# Patient Record
Sex: MALE | Race: WHITE | NOT HISPANIC OR LATINO | Employment: FULL TIME | URBAN - NONMETROPOLITAN AREA
[De-identification: names, ages, dates, MRNs, and addresses within clinical notes are randomized per-mention and may not be internally consistent; named-entity substitution may affect disease eponyms.]

---

## 2021-03-10 NOTE — PROGRESS NOTES
Otolaryngology Consultation    Patient: Shane Rai  : 1970    Patient presents with:  Consult: Allergies; Referred by Elizabeth Ibarra      HPI:  Shane Rai is a 50 year old male seen today for concerns for allergies      He has chronic post nasal drainage      After he eats he note bilateral nasal congestion  Heat will also cause congestion  No obvious worsening with exercise      No history of perennial allergic rhinitis in  he underwent skin testing with Dr. Tomas that showed high sensitivity to tree grass cocklebur mugwort mild dust  He had covid in mid December and still notes hyoposmia and occasional shortness of breath   He notes occasion choking with oral intake    He also notes bilateral tinnitus, recent audio laurentian stated as normal     Current treatment includes Claritin-D Flonase and albuterol he also has an EpiPen    Prior sinus surgery with me around , notes not in epic  He denies chronic sinusitis.  There is no chronic facial pressure purulent rhinorrhea    Apples, cherries, almonds all based on prior testing.  No hx of anaphylaxis with food intake  With green apples he had oral swelling of the lips, no anaphylaxis  Juanita cherries caused lip swelling, no other symptoms  No problems with almond intake      no recent CT sinus    CT sinuses  could not be pulled up in epic but showed hx of FESS with patent omu, mild to moderate pansinusitis      Quit smoking     He also notices a thickening in the back of his tongue he does have sleep apnea and is not CPAP compliant he felt that the cold air blowing through his nasal CPAP caused him a headache  No weight loss  No dysphonia  No chronic otalgia    He has noticed heat intolerance and a sensation as though he may pass out occasionally at work.  He works as a     He does have occasional GERD and describes a recent transnasal esophagoscopy in [place].  Overall this is under fair control  Current Outpatient Rx   Medication Sig  Dispense Refill     albuterol (PROAIR HFA/PROVENTIL HFA/VENTOLIN HFA) 108 (90 Base) MCG/ACT inhaler Inhale 2 puffs into the lungs every 6 hours       clobetasol (TEMOVATE) 0.05 % ointment Apply  topically 2 times daily.       desonide (DESOWEN) 0.05 % external cream Apply topically 2 times daily       dexlansoprazole (DEXILANT) 30 MG CPDR CR capsule Take 30 mg by mouth daily       EPINEPHrine (ANY BX GENERIC EQUIV) 0.3 MG/0.3ML injection 2-pack Inject 0.3 mg into the muscle as needed for anaphylaxis       fluticasone (FLONASE) 50 MCG/ACT nasal spray Spray 1 spray into both nostrils daily       LEVOTHYROXINE SODIUM PO Take 125 mcg by mouth        loratadine-pseudoePHEDrine (CLARITIN-D 24-HOUR)  MG 24 hr tablet Take 1 tablet by mouth daily       olopatadine (PATADAY) 0.2 % ophthalmic solution 1 drop daily       HYDROcodone-acetaminophen (NORCO) 5-325 MG per tablet Take 1-2 tablets by mouth every 4 hours as needed for moderate to severe pain (Patient not taking: Reported on 3/11/2021) 15 tablet 0       Allergies: Seasonal allergies and Zyrtec [cetirizine]     Past Medical History:   Diagnosis Date     Gastro-oesophageal reflux disease      Hypothyroidism        Past Surgical History:   Procedure Laterality Date     deviated septum[       ESOPHAGOSCOPY, GASTROSCOPY, DUODENOSCOPY (EGD), COMBINED  2014    Procedure: COMBINED ESOPHAGOSCOPY, GASTROSCOPY, DUODENOSCOPY (EGD);  UPPER ENDOSCOPY WITH BIOPSY;  Surgeon: Dima Peraza MD;  Location: [facility]     VASECTOMY         ENT family history reviewed    Social History     Tobacco Use     Smoking status: Former Smoker     Types: Cigarettes     Quit date: 2010     Years since quittin.1     Smokeless tobacco: Never Used   Substance Use Topics     Alcohol use: No     Drug use: None       Review of Systems  ROS: 10 point ROS neg other than the symptoms noted above in the HPI and occasional cough shortness of breath with exertion dry skin hot intolerance  "headaches joint pain    Physical Exam  /86   Pulse 93   Temp 97.8  F (36.6  C) (Tympanic)   Resp 16   Ht 1.778 m (5' 10\")   Wt 87.1 kg (192 lb)   SpO2 97%   BMI 27.55 kg/m    General - The patient is well nourished and well developed, and appears to have good nutritional status.  Alert and oriented to person and place, answers questions and cooperates with examination appropriately.   Head and Face - Normocephalic and atraumatic, with no gross asymmetry noted.  The facial nerve is intact, with strong symmetric movements.  Voice and Breathing - The patient was breathing comfortably without the use of accessory muscles. There was no wheezing, stridor, or stertor.  The patients voice was clear and strong, and had appropriate pitch and quality.  No trevor peripheral digital clubbing or cyanosis   Ears -The external auditory canals are patent, the tympanic membranes are intact without effusion, retraction or mass.  Bony landmarks are intact.  Eyes - Extraocular movements intact, and the pupils were reactive to light.  Sclera were not icteric or injected, conjunctiva were pink and moist.  Mouth - Examination of the oral cavity showed pink, healthy oral mucosa. No lesions or ulcerations noted.  The tongue was mobile and midline, and the dentition were in good condition.    Throat - The walls of the oropharynx were smooth, pink, moist, symmetric, and had no lesions or ulcerations.  The tonsillar pillars and soft palate were symmetric.  The uvula was midline on elevation.  Ball Tongue Position III, grade 1 tonsils   Neck - No palpable enlarged fixed cervical lymph nodes.  No neck cysts or unusual tenderness to palpation.   No palpable fixed thyroid nodules or concerning goiter.  The trachea is grossly midline.   Nose - External contour is symmetric, no gross deflection or scars.  Nasal mucosa is pink and moist with no abnormal mucus.  The septum and turbinates were evaluated: mild TH, septal button in place.  " No polyps, masses, or purulence noted on examination.    Attempts at mirror laryngoscopy were not possible due to gag reflex.  Therefore I proceeded with a fiberoptic examination after informed consent.  First I applied topical nasal lidocaine and neosynephrine.  I then passed the scope through the nasal cavity.  Antrostomy patent bilaterally visualized portion of the ethmoid cavity clear no purulence or polyposis no synechiae    The nasopharynx was mucosally covered and symmetric.  The eustachian tube openings were unobstructed.  Going further down I had a clear view of the base of tongue which had normal appearing grade 3 nonerythematous lingual tonsillar tissue.  The base of tongue was free of lesions, and the vallecula was open.  The epiglottis was smooth and mucosally covered.  The supraglottic larynx was then clearly visualized.  The vocal cords moved smoothly and symmetrically and were pearly white.  No endolaryngeal mass erythema or edema.  The pyriform sinuses were open and without trevor mass or pooling of secretions upon valsalva, and the limited view of the postcricoid region did not show any lesions.  The patient tolerated the procedure well.        Impression and Plan- Shane LEEANN Rai is a 50 year old male with:    ICD-10-CM    1. Food allergy  Z91.018 Food Panel ADULT ([employer])   2. Heat intolerance  R68.89 TSH with free T4 reflex   3. Perennial allergic rhinitis  J30.89 loratadine (CLARITIN) 10 MG tablet     levocetirizine (XYZAL) 5 MG tablet   4. oral allergy syndrome  T78.1XXA    5. Other specified hypothyroidism  E03.8    6. Hypertrophic soft palate  K13.79    7. Intolerance of continuous positive airway pressure (CPAP) ventilation  Z78.9    8. JUVENTINO (obstructive sleep apnea)  G47.33    9. History of sinus surgery  Z98.890        Follow-up for food serum specific IgE  Serology panel for potential food allergy has been ordered.  I discussed with the patient that there are many false positive results with  serology for food allergies.  The best treatment of food allergy is elimination of the offending food and dietary journal upon reintroduction in 6 weeks, as long as there is no history of anaphylaxis.    A sleep study will be arranged if this has not already been done at next visit.    Use Flonase 2 sprays, once daily  Use Budesonide Rinses Twice Daily  Use plain Claritin in the AM  Use Xyzal at night  Stop claritin d and pataday eye drops  Complete food blood test and thyroid lab  Increase water  Decrease caffeine  Complete Allergy Skin Testing    Audiogram release [facility]    Intradermal allergy testing , risks including anaphylaxis discussed     Stop pseudoephedrine  Start claritin am, xyzal pm    Stop pataday drops    If chronic congestion persists consider an office turbinate reduction otherwise at this point no indication for additional sinus surgery his sinuses look good    Overall he has a mixed rhinitis he clearly has perennial allergic rhinitis but has some symptoms of nonallergic rhinitis with sensitivity to hot and cold changes.  This was all discussed      Risks of untreated sleep apnea are well documented, including but not limited to, the inability to reach restorative sleep leading to daytime somnolence, fatigue, irritability and poor work performance, risk of motor vehicle accidents, depression, memory issues, waking headaches, impotence, and increased nocturia.  More serious risks include concerns with medication/narcotic use and surgery related to the use of anesthesia, coronary artery disease, heart failure, heart attack, stroke and sudden death.    Achieving a healthy weight, adhering to a healthy diet, and exercise are very important in the treatment of sleep apnea and were discussed and encouraged.    Clinical evidence shows CPAP to be the treatment of choice for obstructive sleep apnea. However, if CPAP is not tolerated after reasonable attempts at treatment, surgical intervention  may be necessary.   A sleep study will be arranged if this has not already been done at next visit.            Una Devlin D.O.  Otolaryngology/Head and Neck Surgery  Allergy

## 2021-03-11 ENCOUNTER — OFFICE VISIT (OUTPATIENT)
Dept: OTOLARYNGOLOGY | Facility: OTHER | Age: 51
End: 2021-03-11
Attending: OTOLARYNGOLOGY
Payer: COMMERCIAL

## 2021-03-11 VITALS
HEIGHT: 70 IN | WEIGHT: 192 LBS | HEART RATE: 93 BPM | RESPIRATION RATE: 16 BRPM | SYSTOLIC BLOOD PRESSURE: 132 MMHG | DIASTOLIC BLOOD PRESSURE: 86 MMHG | OXYGEN SATURATION: 97 % | BODY MASS INDEX: 27.49 KG/M2 | TEMPERATURE: 97.8 F

## 2021-03-11 DIAGNOSIS — J30.89 PERENNIAL ALLERGIC RHINITIS: ICD-10-CM

## 2021-03-11 DIAGNOSIS — Z78.9 INTOLERANCE OF CONTINUOUS POSITIVE AIRWAY PRESSURE (CPAP) VENTILATION: ICD-10-CM

## 2021-03-11 DIAGNOSIS — E03.8 OTHER SPECIFIED HYPOTHYROIDISM: ICD-10-CM

## 2021-03-11 DIAGNOSIS — Z91.018 FOOD ALLERGY: Primary | ICD-10-CM

## 2021-03-11 DIAGNOSIS — T78.1XXA POLLEN-FOOD ALLERGY, INITIAL ENCOUNTER: ICD-10-CM

## 2021-03-11 DIAGNOSIS — K13.79 HYPERTROPHIC SOFT PALATE: ICD-10-CM

## 2021-03-11 DIAGNOSIS — R68.89 HEAT INTOLERANCE: ICD-10-CM

## 2021-03-11 DIAGNOSIS — Z98.890 HISTORY OF SINUS SURGERY: ICD-10-CM

## 2021-03-11 DIAGNOSIS — G47.33 OSA (OBSTRUCTIVE SLEEP APNEA): ICD-10-CM

## 2021-03-11 LAB — TSH SERPL DL<=0.005 MIU/L-ACNC: 2.65 MU/L (ref 0.4–4)

## 2021-03-11 PROCEDURE — 31575 DIAGNOSTIC LARYNGOSCOPY: CPT | Performed by: OTOLARYNGOLOGY

## 2021-03-11 PROCEDURE — 99204 OFFICE O/P NEW MOD 45 MIN: CPT | Mod: 25 | Performed by: OTOLARYNGOLOGY

## 2021-03-11 PROCEDURE — 86003 ALLG SPEC IGE CRUDE XTRC EA: CPT | Mod: 90 | Performed by: OTOLARYNGOLOGY

## 2021-03-11 PROCEDURE — 36415 COLL VENOUS BLD VENIPUNCTURE: CPT | Performed by: OTOLARYNGOLOGY

## 2021-03-11 PROCEDURE — 84443 ASSAY THYROID STIM HORMONE: CPT | Performed by: OTOLARYNGOLOGY

## 2021-03-11 RX ORDER — FLUTICASONE PROPIONATE 50 MCG
2 SPRAY, SUSPENSION (ML) NASAL DAILY
COMMUNITY
End: 2021-03-11

## 2021-03-11 RX ORDER — DESONIDE 0.5 MG/G
CREAM TOPICAL 2 TIMES DAILY
COMMUNITY

## 2021-03-11 RX ORDER — LEVOCETIRIZINE DIHYDROCHLORIDE 5 MG/1
5 TABLET, FILM COATED ORAL EVERY EVENING
Qty: 120 TABLET | Status: SHIPPED | OUTPATIENT
Start: 2021-03-11 | End: 2021-06-18

## 2021-03-11 RX ORDER — OLOPATADINE HYDROCHLORIDE 2 MG/ML
1 SOLUTION/ DROPS OPHTHALMIC DAILY
COMMUNITY
End: 2021-03-11 | Stop reason: ALTCHOICE

## 2021-03-11 RX ORDER — EPINEPHRINE 0.3 MG/.3ML
0.3 INJECTION SUBCUTANEOUS PRN
COMMUNITY
End: 2022-05-06

## 2021-03-11 RX ORDER — ALBUTEROL SULFATE 90 UG/1
2 AEROSOL, METERED RESPIRATORY (INHALATION) EVERY 6 HOURS
COMMUNITY

## 2021-03-11 RX ORDER — DEXLANSOPRAZOLE 30 MG/1
30 CAPSULE, DELAYED RELEASE ORAL DAILY
COMMUNITY

## 2021-03-11 RX ORDER — FLUTICASONE PROPIONATE 50 MCG
2 SPRAY, SUSPENSION (ML) NASAL DAILY
Qty: 16 G | Refills: 12 | Status: SHIPPED | OUTPATIENT
Start: 2021-03-11

## 2021-03-11 RX ORDER — LORATADINE 10 MG/1
10 TABLET ORAL EVERY MORNING
Qty: 120 TABLET | Status: SHIPPED | OUTPATIENT
Start: 2021-03-11 | End: 2021-07-09

## 2021-03-11 ASSESSMENT — MIFFLIN-ST. JEOR: SCORE: 1742.16

## 2021-03-11 ASSESSMENT — PAIN SCALES - GENERAL: PAINLEVEL: NO PAIN (0)

## 2021-03-11 NOTE — NURSING NOTE
"Chief Complaint   Patient presents with     Consult     Allergies; Referred by Elizabeth Ibarra       Initial /86   Pulse 93   Temp 97.8  F (36.6  C) (Tympanic)   Resp 16   Ht 1.778 m (5' 10\")   Wt 87.1 kg (192 lb)   SpO2 97%   BMI 27.55 kg/m   Estimated body mass index is 27.55 kg/m  as calculated from the following:    Height as of this encounter: 1.778 m (5' 10\").    Weight as of this encounter: 87.1 kg (192 lb).  Medication Reconciliation: complete  Kate Galloway LPN  "

## 2021-03-11 NOTE — LETTER
3/11/2021         RE: Shane Rai  [address] [facility] [address]        Dear Colleague,    Thank you for referring your patient, Shane Rai, to the [facility]. Please see a copy of my visit note below.    Otolaryngology Consultation    Patient: Shane Rai  : 1970    Patient presents with:  Consult: Allergies; Referred by Elizabeth Ibarra      HPI:  Shane Rai is a 50 year old male seen today for concerns for allergies      He has chronic post nasal drainage      After he eats he note bilateral nasal congestion  Heat will also cause congestion  No obvious worsening with exercise      No history of perennial allergic rhinitis in  he underwent skin testing with Dr. Tomas that showed high sensitivity to tree grass cocklebur mugwort mild dust  He had covid in mid December and still notes hyoposmia and occasional shortness of breath   He notes occasion choking with oral intake    He also notes bilateral tinnitus, recent audio laurentian stated as normal     Current treatment includes Claritin-D Flonase and albuterol he also has an EpiPen    Prior sinus surgery with me around , notes not in epic  He denies chronic sinusitis.  There is no chronic facial pressure purulent rhinorrhea    Apples, cherries, almonds all based on prior testing.  No hx of anaphylaxis with food intake  With green apples he had oral swelling of the lips, no anaphylaxis  Juanita cherries caused lip swelling, no other symptoms  No problems with almond intake      no recent CT sinus    CT sinuses  could not be pulled up in epic but showed hx of FESS with patent omu, mild to moderate pansinusitis      Quit smoking     He also notices a thickening in the back of his tongue he does have sleep apnea and is not CPAP compliant he felt that the cold air blowing through his nasal CPAP caused him a headache  No weight loss  No dysphonia  No chronic otalgia    He has noticed heat intolerance and a sensation  as though he may pass out occasionally at work.  He works as a     He does have occasional GERD and describes a recent transnasal esophagoscopy in [place].  Overall this is under fair control  Current Outpatient Rx   Medication Sig Dispense Refill     albuterol (PROAIR HFA/PROVENTIL HFA/VENTOLIN HFA) 108 (90 Base) MCG/ACT inhaler Inhale 2 puffs into the lungs every 6 hours       clobetasol (TEMOVATE) 0.05 % ointment Apply  topically 2 times daily.       desonide (DESOWEN) 0.05 % external cream Apply topically 2 times daily       dexlansoprazole (DEXILANT) 30 MG CPDR CR capsule Take 30 mg by mouth daily       EPINEPHrine (ANY BX GENERIC EQUIV) 0.3 MG/0.3ML injection 2-pack Inject 0.3 mg into the muscle as needed for anaphylaxis       fluticasone (FLONASE) 50 MCG/ACT nasal spray Spray 1 spray into both nostrils daily       LEVOTHYROXINE SODIUM PO Take 125 mcg by mouth        loratadine-pseudoePHEDrine (CLARITIN-D 24-HOUR)  MG 24 hr tablet Take 1 tablet by mouth daily       olopatadine (PATADAY) 0.2 % ophthalmic solution 1 drop daily       HYDROcodone-acetaminophen (NORCO) 5-325 MG per tablet Take 1-2 tablets by mouth every 4 hours as needed for moderate to severe pain (Patient not taking: Reported on 3/11/2021) 15 tablet 0       Allergies: Seasonal allergies and Zyrtec [cetirizine]     Past Medical History:   Diagnosis Date     Gastro-oesophageal reflux disease      Hypothyroidism        Past Surgical History:   Procedure Laterality Date     deviated septum[       ESOPHAGOSCOPY, GASTROSCOPY, DUODENOSCOPY (EGD), COMBINED  2014    Procedure: COMBINED ESOPHAGOSCOPY, GASTROSCOPY, DUODENOSCOPY (EGD);  UPPER ENDOSCOPY WITH BIOPSY;  Surgeon: Dima Peraza MD;  Location: [facility]     VASECTOMY         ENT family history reviewed    Social History     Tobacco Use     Smoking status: Former Smoker     Types: Cigarettes     Quit date: 2010     Years since quittin.1     Smokeless tobacco: Never Used  "  Substance Use Topics     Alcohol use: No     Drug use: None       Review of Systems  ROS: 10 point ROS neg other than the symptoms noted above in the HPI and occasional cough shortness of breath with exertion dry skin hot intolerance headaches joint pain    Physical Exam  /86   Pulse 93   Temp 97.8  F (36.6  C) (Tympanic)   Resp 16   Ht 1.778 m (5' 10\")   Wt 87.1 kg (192 lb)   SpO2 97%   BMI 27.55 kg/m    General - The patient is well nourished and well developed, and appears to have good nutritional status.  Alert and oriented to person and place, answers questions and cooperates with examination appropriately.   Head and Face - Normocephalic and atraumatic, with no gross asymmetry noted.  The facial nerve is intact, with strong symmetric movements.  Voice and Breathing - The patient was breathing comfortably without the use of accessory muscles. There was no wheezing, stridor, or stertor.  The patients voice was clear and strong, and had appropriate pitch and quality.  No trevor peripheral digital clubbing or cyanosis   Ears -The external auditory canals are patent, the tympanic membranes are intact without effusion, retraction or mass.  Bony landmarks are intact.  Eyes - Extraocular movements intact, and the pupils were reactive to light.  Sclera were not icteric or injected, conjunctiva were pink and moist.  Mouth - Examination of the oral cavity showed pink, healthy oral mucosa. No lesions or ulcerations noted.  The tongue was mobile and midline, and the dentition were in good condition.    Throat - The walls of the oropharynx were smooth, pink, moist, symmetric, and had no lesions or ulcerations.  The tonsillar pillars and soft palate were symmetric.  The uvula was midline on elevation.  Ball Tongue Position III, grade 1 tonsils   Neck - No palpable enlarged fixed cervical lymph nodes.  No neck cysts or unusual tenderness to palpation.   No palpable fixed thyroid nodules or concerning goiter. "  The trachea is grossly midline.   Nose - External contour is symmetric, no gross deflection or scars.  Nasal mucosa is pink and moist with no abnormal mucus.  The septum and turbinates were evaluated: mild TH, septal button in place.  No polyps, masses, or purulence noted on examination.    Attempts at mirror laryngoscopy were not possible due to gag reflex.  Therefore I proceeded with a fiberoptic examination after informed consent.  First I applied topical nasal lidocaine and neosynephrine.  I then passed the scope through the nasal cavity.  Antrostomy patent bilaterally visualized portion of the ethmoid cavity clear no purulence or polyposis no synechiae    The nasopharynx was mucosally covered and symmetric.  The eustachian tube openings were unobstructed.  Going further down I had a clear view of the base of tongue which had normal appearing grade 3 nonerythematous lingual tonsillar tissue.  The base of tongue was free of lesions, and the vallecula was open.  The epiglottis was smooth and mucosally covered.  The supraglottic larynx was then clearly visualized.  The vocal cords moved smoothly and symmetrically and were pearly white.  No endolaryngeal mass erythema or edema.  The pyriform sinuses were open and without trevor mass or pooling of secretions upon valsalva, and the limited view of the postcricoid region did not show any lesions.  The patient tolerated the procedure well.        Impression and Plan- Shane LEEANN Rai is a 50 year old male with:    ICD-10-CM    1. Food allergy  Z91.018 Food Panel ADULT ([employer])   2. Heat intolerance  R68.89 TSH with free T4 reflex   3. Perennial allergic rhinitis  J30.89 loratadine (CLARITIN) 10 MG tablet     levocetirizine (XYZAL) 5 MG tablet   4. oral allergy syndrome  T78.1XXA    5. Other specified hypothyroidism  E03.8    6. Hypertrophic soft palate  K13.79    7. Intolerance of continuous positive airway pressure (CPAP) ventilation  Z78.9    8. JUVENTINO (obstructive sleep  apnea)  G47.33    9. History of sinus surgery  Z98.890        Follow-up for food serum specific IgE  Serology panel for potential food allergy has been ordered.  I discussed with the patient that there are many false positive results with serology for food allergies.  The best treatment of food allergy is elimination of the offending food and dietary journal upon reintroduction in 6 weeks, as long as there is no history of anaphylaxis.    A sleep study will be arranged if this has not already been done at next visit.    Use Flonase 2 sprays, once daily  Use Budesonide Rinses Twice Daily  Use plain Claritin in the AM  Use Xyzal at night  Stop claritin d and pataday eye drops  Complete food blood test and thyroid lab  Increase water  Decrease caffeine  Complete Allergy Skin Testing    Audiogram release [facility]    Intradermal allergy testing , risks including anaphylaxis discussed     Stop pseudoephedrine  Start claritin am, xyzal pm    Stop pataday drops    If chronic congestion persists consider an office turbinate reduction otherwise at this point no indication for additional sinus surgery his sinuses look good    Overall he has a mixed rhinitis he clearly has perennial allergic rhinitis but has some symptoms of nonallergic rhinitis with sensitivity to hot and cold changes.  This was all discussed      Risks of untreated sleep apnea are well documented, including but not limited to, the inability to reach restorative sleep leading to daytime somnolence, fatigue, irritability and poor work performance, risk of motor vehicle accidents, depression, memory issues, waking headaches, impotence, and increased nocturia.  More serious risks include concerns with medication/narcotic use and surgery related to the use of anesthesia, coronary artery disease, heart failure, heart attack, stroke and sudden death.    Achieving a healthy weight, adhering to a healthy diet, and exercise are very important in the  treatment of sleep apnea and were discussed and encouraged.    Clinical evidence shows CPAP to be the treatment of choice for obstructive sleep apnea. However, if CPAP is not tolerated after reasonable attempts at treatment, surgical intervention may be necessary.   A sleep study will be arranged if this has not already been done at next visit.            Una Devlin D.O.  Otolaryngology/Head and Neck Surgery  Allergy        Again, thank you for allowing me to participate in the care of your patient.        Sincerely,        Una Devlin MD

## 2021-03-12 ENCOUNTER — TELEPHONE (OUTPATIENT)
Dept: ALLERGY | Facility: OTHER | Age: 51
End: 2021-03-12

## 2021-03-12 NOTE — TELEPHONE ENCOUNTER
Went over instructions with patient for allergy skin testing.  Reviewed patients current medications and patient will avoid all contraindicated medications prior to MQT testing.  Patient verbalizes understanding.  Copy of allergy testing packet will be mailed to patient.  Call transferred to PAC to schedule test and follow-up.  Patient is advised to call with any questions before testing.    Rissa Cash RN

## 2021-03-18 NOTE — PROGRESS NOTES
Chief Complaint   Patient presents with     Other     MQT testing         Patient returns for MQT  Last seen by Dr. Devlin on 3/11/21  He has not started on rinses, antihistamines.   Shane has rinsed before in the past and felt that some would remain present.   He has been using Flonase and had some nasal burning. He has since felt the sensation with some relief. He has tried Nasacort in past.   He was started on BID AH, Claritin/ Xyzal.     MQT- 3/19/21  Dilution #6- Birch  Dilution #5-Grass, oak, mold, cat, dog, dust  Dilution #2- weeds, maple, elm, cottonwood, molds.     Food Panel completed- Pending.   No history of perennial allergic rhinitis in 2012 he underwent skin testing with Dr. Tomas that showed high sensitivity to tree grass cocklebur mugwort mild dust  He had covid in mid December and still notes hyoposmia and occasional shortness of breath   He notes occasion choking with oral intake     Apples, cherries, almonds all based on prior testing.  No hx of anaphylaxis with food intake  With green apples he had oral swelling of the lips, no anaphylaxis  Juanita cherries caused lip swelling, no other symptoms  No problems with almond intake  Past Medical History:   Diagnosis Date     Gastro-oesophageal reflux disease      Hypothyroidism         Allergies   Allergen Reactions     Seasonal Allergies      Birch trees, oak trees, grass pollen, mugwart     Zyrtec [Cetirizine] Other (See Comments)     HOT BURNING feeling over entire body.     Current Outpatient Medications   Medication     albuterol (PROAIR HFA/PROVENTIL HFA/VENTOLIN HFA) 108 (90 Base) MCG/ACT inhaler     clobetasol (TEMOVATE) 0.05 % ointment     desonide (DESOWEN) 0.05 % external cream     dexlansoprazole (DEXILANT) 30 MG CPDR CR capsule     EPINEPHrine (ANY BX GENERIC EQUIV) 0.3 MG/0.3ML injection 2-pack     fluticasone (FLONASE) 50 MCG/ACT nasal spray     HYDROcodone-acetaminophen (NORCO) 5-325 MG per tablet     levocetirizine (XYZAL) 5 MG  "tablet     LEVOTHYROXINE SODIUM PO     loratadine (CLARITIN) 10 MG tablet     No current facility-administered medications for this visit.       ROS: 10 point ROS neg other than the symptoms noted above in the HPI.  BP (!) 142/93 (BP Location: Right arm, Patient Position: Sitting, Cuff Size: Adult Regular)   Pulse 78   Temp 97.9  F (36.6  C) (Oral)   Ht 1.778 m (5' 10\")   Wt 88.5 kg (195 lb)   SpO2 97%   BMI 27.98 kg/m      and oriented to person and place, answers questions and cooperates with examination appropriately.   Head and Face - Normocephalic and atraumatic, with no gross asymmetry noted.  The facial nerve is intact, with strong symmetric movements.  Voice and Breathing - The patient was breathing comfortably without the use of accessory muscles. There was no wheezing, stridor, or stertor.  The patients voice was clear and strong, and had appropriate pitch and quality.  No trevor peripheral digital clubbing or cyanosis   Ears -The external auditory canals are patent, the tympanic membranes are intact without effusion, retraction or mass.  Bony landmarks are intact.  Eyes - Extraocular movements intact, and the pupils were reactive to light.  Sclera were not icteric or injected, conjunctiva were pink and moist.  Mouth - Examination of the oral cavity showed pink, healthy oral mucosa. No lesions or ulcerations noted.  The tongue was mobile and midline, and the dentition were in good condition.    Throat - The walls of the oropharynx were smooth, pink, moist, symmetric, and had no lesions or ulcerations.  The tonsillar pillars and soft palate were symmetric.  The uvula was midline on elevation.  Ball Tongue Position III, grade 1 tonsils   Neck - No palpable enlarged fixed cervical lymph nodes.  No neck cysts or unusual tenderness to palpation.   No palpable fixed thyroid nodules or concerning goiter.  The trachea is grossly midline.   Nose - External contour is symmetric, no gross deflection or " scars.  Nasal mucosa is pink and moist with no abnormal mucus.  The septum and turbinates were evaluated: mild TH, septal button in place.  No polyps, masses, or purulence noted on examination.      ASSESSMENT:    ICD-10-CM    1. Allergic reaction, subsequent encounter  T78.40XD EPINEPHrine (ANY BX GENERIC EQUIV) 0.3 MG/0.3ML injection 2-pack   2. Food allergy  Z91.018    3. Perennial allergic rhinitis  J30.89    4. oral allergy syndrome  T78.1XXA    5. JUVENTINO (obstructive sleep apnea)  G47.33    6. Intolerance of continuous positive airway pressure (CPAP) ventilation  Z78.9        Food panel is pending at this time. Will call with results.   Sleep medicine referral placed.   Epipen sent in. Must bring with you to start allergy injections.   Start Xyzal tonight and Start Claritin 10 mg in the AM.   Start Fabián Med rinse. Will send in new request for Budesonide. (avoid rinsing for 1 hour before bed)  Continue with Flonase.     Follow up in 3 months.     Sleep study ordered. He does need to restart use of CPAP. Risks and complications of untreated JUVENTINO reviewed.       Krupa Fowler PA-C  ENT  [facility], [place]  [phone]

## 2021-03-19 ENCOUNTER — TELEPHONE (OUTPATIENT)
Dept: ALLERGY | Facility: OTHER | Age: 51
End: 2021-03-19

## 2021-03-19 ENCOUNTER — OFFICE VISIT (OUTPATIENT)
Dept: ALLERGY | Facility: OTHER | Age: 51
End: 2021-03-19
Attending: PHYSICIAN ASSISTANT
Payer: COMMERCIAL

## 2021-03-19 ENCOUNTER — OFFICE VISIT (OUTPATIENT)
Dept: OTOLARYNGOLOGY | Facility: OTHER | Age: 51
End: 2021-03-19
Attending: PHYSICIAN ASSISTANT
Payer: COMMERCIAL

## 2021-03-19 VITALS
SYSTOLIC BLOOD PRESSURE: 142 MMHG | OXYGEN SATURATION: 97 % | WEIGHT: 195 LBS | TEMPERATURE: 97.9 F | HEART RATE: 78 BPM | DIASTOLIC BLOOD PRESSURE: 93 MMHG | BODY MASS INDEX: 27.92 KG/M2 | HEIGHT: 70 IN

## 2021-03-19 DIAGNOSIS — J30.89 PERENNIAL ALLERGIC RHINITIS: Primary | ICD-10-CM

## 2021-03-19 DIAGNOSIS — Z78.9 INTOLERANCE OF CONTINUOUS POSITIVE AIRWAY PRESSURE (CPAP) VENTILATION: ICD-10-CM

## 2021-03-19 DIAGNOSIS — Z91.018 FOOD ALLERGY: ICD-10-CM

## 2021-03-19 DIAGNOSIS — T78.1XXA POLLEN-FOOD ALLERGY, INITIAL ENCOUNTER: ICD-10-CM

## 2021-03-19 DIAGNOSIS — T78.40XD ALLERGIC REACTION, SUBSEQUENT ENCOUNTER: Primary | ICD-10-CM

## 2021-03-19 DIAGNOSIS — G47.33 OSA (OBSTRUCTIVE SLEEP APNEA): ICD-10-CM

## 2021-03-19 DIAGNOSIS — J30.89 PERENNIAL ALLERGIC RHINITIS: ICD-10-CM

## 2021-03-19 PROCEDURE — 95004 PERQ TESTS W/ALRGNC XTRCS: CPT

## 2021-03-19 PROCEDURE — 99213 OFFICE O/P EST LOW 20 MIN: CPT | Mod: 25 | Performed by: PHYSICIAN ASSISTANT

## 2021-03-19 PROCEDURE — 95024 IQ TESTS W/ALLERGENIC XTRCS: CPT

## 2021-03-19 RX ORDER — EPINEPHRINE 0.3 MG/.3ML
0.3 INJECTION SUBCUTANEOUS PRN
Qty: 0.6 ML | Refills: 1 | Status: SHIPPED | OUTPATIENT
Start: 2021-03-19 | End: 2023-03-04

## 2021-03-19 ASSESSMENT — PAIN SCALES - GENERAL: PAINLEVEL: NO PAIN (0)

## 2021-03-19 ASSESSMENT — ASTHMA QUESTIONNAIRES
QUESTION_2 LAST FOUR WEEKS HOW OFTEN HAVE YOU HAD SHORTNESS OF BREATH: ONCE OR TWICE A WEEK
ACT_TOTALSCORE: 22
QUESTION_4 LAST FOUR WEEKS HOW OFTEN HAVE YOU USED YOUR RESCUE INHALER OR NEBULIZER MEDICATION (SUCH AS ALBUTEROL): ONCE A WEEK OR LESS
QUESTION_3 LAST FOUR WEEKS HOW OFTEN DID YOUR ASTHMA SYMPTOMS (WHEEZING, COUGHING, SHORTNESS OF BREATH, CHEST TIGHTNESS OR PAIN) WAKE YOU UP AT NIGHT OR EARLIER THAN USUAL IN THE MORNING: NOT AT ALL
QUESTION_5 LAST FOUR WEEKS HOW WOULD YOU RATE YOUR ASTHMA CONTROL: WELL CONTROLLED
QUESTION_1 LAST FOUR WEEKS HOW MUCH OF THE TIME DID YOUR ASTHMA KEEP YOU FROM GETTING AS MUCH DONE AT WORK, SCHOOL OR AT HOME: NONE OF THE TIME

## 2021-03-19 ASSESSMENT — MIFFLIN-ST. JEOR: SCORE: 1755.76

## 2021-03-19 NOTE — PROGRESS NOTES
Shane was seen for allergy skin testing. Patient was seen by this nurse in conjunction with ENT provider. All encounter details are documented in ENT Provider's appointment from this same date. Please see referenced encounter for this visits documentation.     Clyde Blackwood RN on 3/19/2021 at 10:20 AM

## 2021-03-19 NOTE — LETTER
3/19/2021         RE: Shane Rai  [address] [facility] [address]        Dear Colleague,    Thank you for referring your patient, Shane Rai, to the [facility]. Please see a copy of my visit note below.    Chief Complaint   Patient presents with     Other     MQT testing         Patient returns for MQT  Last seen by Dr. Devlin on 3/11/21  He has not started on rinses, antihistamines.   Shane has rinsed before in the past and felt that some would remain present.   He has been using Flonase and had some nasal burning. He has since felt the sensation with some relief. He has tried Nasacort in past.   He was started on BID AH, Claritin/ Xyzal.     MQT- 3/19/21  Dilution #6- Birch  Dilution #5-Grass, oak, mold, cat, dog, dust  Dilution #2- weeds, maple, elm, cottonwood, molds.     Food Panel completed- Pending.   No history of perennial allergic rhinitis in 2012 he underwent skin testing with Dr. Tomas that showed high sensitivity to tree grass cocklebur mugwort mild dust  He had covid in mid December and still notes hyoposmia and occasional shortness of breath   He notes occasion choking with oral intake     Apples, cherries, almonds all based on prior testing.  No hx of anaphylaxis with food intake  With green apples he had oral swelling of the lips, no anaphylaxis  Juanita cherries caused lip swelling, no other symptoms  No problems with almond intake  Past Medical History:   Diagnosis Date     Gastro-oesophageal reflux disease      Hypothyroidism         Allergies   Allergen Reactions     Seasonal Allergies      Birch trees, oak trees, grass pollen, mugwart     Zyrtec [Cetirizine] Other (See Comments)     HOT BURNING feeling over entire body.     Current Outpatient Medications   Medication     albuterol (PROAIR HFA/PROVENTIL HFA/VENTOLIN HFA) 108 (90 Base) MCG/ACT inhaler     clobetasol (TEMOVATE) 0.05 % ointment     desonide (DESOWEN) 0.05 % external cream     dexlansoprazole  "(DEXILANT) 30 MG CPDR CR capsule     EPINEPHrine (ANY BX GENERIC EQUIV) 0.3 MG/0.3ML injection 2-pack     fluticasone (FLONASE) 50 MCG/ACT nasal spray     HYDROcodone-acetaminophen (NORCO) 5-325 MG per tablet     levocetirizine (XYZAL) 5 MG tablet     LEVOTHYROXINE SODIUM PO     loratadine (CLARITIN) 10 MG tablet     No current facility-administered medications for this visit.       ROS: 10 point ROS neg other than the symptoms noted above in the HPI.  BP (!) 142/93 (BP Location: Right arm, Patient Position: Sitting, Cuff Size: Adult Regular)   Pulse 78   Temp 97.9  F (36.6  C) (Oral)   Ht 1.778 m (5' 10\")   Wt 88.5 kg (195 lb)   SpO2 97%   BMI 27.98 kg/m      and oriented to person and place, answers questions and cooperates with examination appropriately.   Head and Face - Normocephalic and atraumatic, with no gross asymmetry noted.  The facial nerve is intact, with strong symmetric movements.  Voice and Breathing - The patient was breathing comfortably without the use of accessory muscles. There was no wheezing, stridor, or stertor.  The patients voice was clear and strong, and had appropriate pitch and quality.  No trevor peripheral digital clubbing or cyanosis   Ears -The external auditory canals are patent, the tympanic membranes are intact without effusion, retraction or mass.  Bony landmarks are intact.  Eyes - Extraocular movements intact, and the pupils were reactive to light.  Sclera were not icteric or injected, conjunctiva were pink and moist.  Mouth - Examination of the oral cavity showed pink, healthy oral mucosa. No lesions or ulcerations noted.  The tongue was mobile and midline, and the dentition were in good condition.    Throat - The walls of the oropharynx were smooth, pink, moist, symmetric, and had no lesions or ulcerations.  The tonsillar pillars and soft palate were symmetric.  The uvula was midline on elevation.  Ball Tongue Position III, grade 1 tonsils   Neck - No palpable " enlarged fixed cervical lymph nodes.  No neck cysts or unusual tenderness to palpation.   No palpable fixed thyroid nodules or concerning goiter.  The trachea is grossly midline.   Nose - External contour is symmetric, no gross deflection or scars.  Nasal mucosa is pink and moist with no abnormal mucus.  The septum and turbinates were evaluated: mild TH, septal button in place.  No polyps, masses, or purulence noted on examination.      ASSESSMENT:    ICD-10-CM    1. Allergic reaction, subsequent encounter  T78.40XD EPINEPHrine (ANY BX GENERIC EQUIV) 0.3 MG/0.3ML injection 2-pack   2. Food allergy  Z91.018    3. Perennial allergic rhinitis  J30.89    4. oral allergy syndrome  T78.1XXA    5. JUVENTINO (obstructive sleep apnea)  G47.33    6. Intolerance of continuous positive airway pressure (CPAP) ventilation  Z78.9        Food panel is pending at this time. Will call with results.   Sleep medicine referral placed.   Epipen sent in. Must bring with you to start allergy injections.   Start Xyzal tonight and Start Claritin 10 mg in the AM.   Start Fabián Med rinse. Will send in new request for Budesonide. (avoid rinsing for 1 hour before bed)  Continue with Flonase.     Follow up in 3 months.     Sleep study ordered. He does need to restart use of CPAP. Risks and complications of untreated JUVENTINO reviewed.       Krupa Fowler PA-C  ENT  [facility], [place]  [phone]        Again, thank you for allowing me to participate in the care of your patient.        Sincerely,        Krupa Fowler PA-C

## 2021-03-19 NOTE — NURSING NOTE
"Chief Complaint   Patient presents with     Other     MQT testing       Initial BP (!) 150/92 (BP Location: Left arm, Patient Position: Sitting, Cuff Size: Adult Regular)   Pulse 78   Temp 97.9  F (36.6  C) (Oral)   Ht 1.778 m (5' 10\")   Wt 88.5 kg (195 lb)   SpO2 97%   BMI 27.98 kg/m   Estimated body mass index is 27.98 kg/m  as calculated from the following:    Height as of this encounter: 1.778 m (5' 10\").    Weight as of this encounter: 88.5 kg (195 lb).  Medication Reconciliation: complete     Clyde Blackwood RN    This patient presents today for allergy skin testing.      Bp was elevated during this visit.  He does have a headache and some dizziness, that he attributes to his allergies and not Bp.  He has seen a cardiologist and Bp's have been wnl.  Bp's today were 147/90, 150/92. 142/93 and 54/93.    Symptoms have included chronic PND, bilateral nasal congestion (worse after eating), he said sometimes it feels like his nose will \"close up\" and he has to mouth breath.  Heat seems to cause an increase in his congestion.  He had Covid in December, and his taste and smell aren't completely back to normal. He has some SOB and occasional choking with oral intake.  He has bilateral tinnitus in his ears. He has no h/o of ear surgeries, but said he has some fluid build up bilaterally.  He denies any real skin concerns.  Today he noticed a slight rash on his neck and cheek, but denies any itching. He has allergy induced asthma, and has a rescue inhaler.  ACT score 22 today.  He has had previous septoplasty and sinus surgery with  in 2015.   He has symptoms year round.     This patient lives in a single family home built in the early 50's.  [place]. There is a basement. No concerns for mold, water or moisture issues in the home.  There is some carpet in the home, and there is carpet in the bedroom.  Home has forced heat and does have a air conditioning wall unit.     This patient " has 1 dog for a pet.  The dog is  Inside and sleeps on his bed.    This patient has had allergy testing in the past around 2012.  He had high sensitivity to trees, grass, cocklebur, mugwort, mold and dust.  He has food allergies.  He has lip swelling from green apples, and hans cherries.  No anaphylaxis noted  He has a food RAST pending.    This patient's medications have been reviewed prior to testing and all appropriate medications have been stopped.    Consent is signed by patient and signature is verified.     MQT/ID test is performed per protocol.  The patient tolerated testing well.  [id number] dose given post testing and benadryl gel applied to both sites s/p testing. All findings are recorded on the paper flow sheet. Results are reviewed with this patient.  They are given written information regarding allergy.       The patient will follow-up with Krupa Fowler PA-C for treatment plan.      Clyde Blackwood RN on 3/19/2021 at 10:14 AM

## 2021-03-19 NOTE — PATIENT INSTRUCTIONS
Food panel is pending at this time. Will call with results.   Sleep medicine referral placed.   Epipen sent in. Must bring with you to start allergy injections.   Start Xyzal tonight and Start Claritin 10 mg at night.   Start Fabián Med rinse. Will send in new request for Budesonide. (avoid rinsing for 1 hour before bed)  Continue with Flonase.     Follow up in 3 months.     Thank you for allowing Krupa Fowler PA-C and our ENT team to participate in your care.  If your medications are too expensive, please give the nurse a call.  We can possibly change this medication.  If you have a scheduling or an appointment question please contact our Health Unit Coordinator at [phone], Ext. [phone].    ALL nursing questions or concerns can be directed to your ENT nurse at: [phone] [place]

## 2021-03-20 ASSESSMENT — ASTHMA QUESTIONNAIRES: ACT_TOTALSCORE: 22

## 2021-03-24 ENCOUNTER — ALLIED HEALTH/NURSE VISIT (OUTPATIENT)
Dept: ALLERGY | Facility: OTHER | Age: 51
End: 2021-03-24
Attending: PHYSICIAN ASSISTANT
Payer: COMMERCIAL

## 2021-03-24 ENCOUNTER — TELEPHONE (OUTPATIENT)
Dept: OTOLARYNGOLOGY | Facility: OTHER | Age: 51
End: 2021-03-24

## 2021-03-24 DIAGNOSIS — J30.89 PERENNIAL ALLERGIC RHINITIS: Primary | ICD-10-CM

## 2021-03-24 PROCEDURE — 95165 ANTIGEN THERAPY SERVICES: CPT | Performed by: PHYSICIAN ASSISTANT

## 2021-03-24 RX ORDER — BUDESONIDE 0.5 MG/2ML
INHALANT ORAL
Qty: 60 ML | Refills: 0 | Status: SHIPPED | OUTPATIENT
Start: 2021-03-24 | End: 2021-04-06

## 2021-03-24 NOTE — TELEPHONE ENCOUNTER
This patient was supposed to start budesonide rinses, but these were never sent in. Please send to [facility]

## 2021-03-24 NOTE — PROGRESS NOTES
Allergy serum is mixed today at schedule Silver 1 of 4,  into  2  (5 ml)  multi dose vial/vials.    Allergens included were:    Ragweed  0.2 ml of dilution # 2  Pigweed  0.2 ml of dilution # 2  Mugwort 0.2 ml of dilution # 0  Kochia  0.2 ml of dilution # 0  Russian Thistle 0.2 ml of dilution # 2  Romel Grass 0.2 ml of dilution # 3  Birch mix 0.2 ml of dilution # 4  Maple Mix 0.2 of dilution # 2  Elm Mix 0.2 ml of dilution # 2  Oak Mix 0.2 ml of dilution # 3  Chele Mix 0.2 ml of dilution # 0  Pine Mix 0.2 ml of dilution # 0  Eastern [place] 0.2 ml of dilution # 2  Black [place] 0.2 ml of dilution # 0  Aspen 0.2 ml of dilution # 0  Red [place] 0.2 ml of dilution # 0    Alternaria 0.2 ml of dilution # 2  Aspergillus 0.2 ml of dilution # 2  Hormodendrum 0.2 ml of dilution # 2  Helminthosporium 0.2 ml of dilution # 2  Penicillium 0.2 ml of dilution # 2  Epicoccum 0.2 ml of dilution # 2  Fusarium 0.2 ml of dilution # 0  Mucor 0.2 ml of dilution # 0  Grain Smut 0.2 ml of dilution # 0  Grass Smut 0.2 ml of dilution # 0  Cat 0.2 ml of dilution # 2  Dog 0.2 ml of dilution # 2  Feather Mix 0.2 ml of dilution # 0  Dust Mite Mix 0.2 ml of dilution # 2  Horse 0.2 ml of dilution # 0    Clyde Blackwood RN on 3/24/2021 at 9:06 AM

## 2021-03-26 ENCOUNTER — TELEPHONE (OUTPATIENT)
Dept: OTOLARYNGOLOGY | Facility: OTHER | Age: 51
End: 2021-03-26

## 2021-04-05 DIAGNOSIS — J30.89 PERENNIAL ALLERGIC RHINITIS: ICD-10-CM

## 2021-04-05 NOTE — TELEPHONE ENCOUNTER
Budesonide  Last Written Prescription Date: 3/24/21  Last Fill Quantity: 60 ml # of Refills: 0  Last Office Visit: 3/19/21

## 2021-04-06 DIAGNOSIS — J30.89 PERENNIAL ALLERGIC RHINITIS: ICD-10-CM

## 2021-04-06 RX ORDER — BUDESONIDE 0.5 MG/2ML
INHALANT ORAL
Qty: 60 ML | Refills: 0 | Status: SHIPPED | OUTPATIENT
Start: 2021-04-06 | End: 2021-05-12

## 2021-04-06 RX ORDER — BUDESONIDE 0.5 MG/2ML
INHALANT ORAL
Qty: 60 ML | Refills: 0 | Status: SHIPPED | OUTPATIENT
Start: 2021-04-06 | End: 2021-04-06

## 2021-04-06 NOTE — TELEPHONE ENCOUNTER
Requesting this med be sent in a 90 day quantity.  Thanks    Budesonide       Last Written Prescription Date:  3/24/21  Last Fill Quantity: 60 mL,   # refills: 0  Last Office Visit: 3/19/21  Future Office visit:    Next 5 appointments (look out 90 days)    Apr 09, 2021  2:30 PM  Office Visit with [facility]  [facility] [address]  [place] [address]  [phone]   Jun 18, 2021  3:45 PM  (Arrive by 3:30 PM)  Return Visit with Krupa Fowler PA-C  [facility] ) [address]  [place] [address]  [phone]           Routing refill request to provider for review/approval because:

## 2021-04-09 ENCOUNTER — OFFICE VISIT (OUTPATIENT)
Dept: ALLERGY | Facility: OTHER | Age: 51
End: 2021-04-09
Attending: PHYSICIAN ASSISTANT
Payer: COMMERCIAL

## 2021-04-09 DIAGNOSIS — J30.89 PERENNIAL ALLERGIC RHINITIS: Primary | ICD-10-CM

## 2021-04-09 PROCEDURE — 95117 IMMUNOTHERAPY INJECTIONS: CPT

## 2021-04-09 NOTE — PROGRESS NOTES
Patient presents to allergy clinic for education and training on subcutaneous immunotherapy.  Patient educated on epi pen and an indications for use.  Patient shown how to use epi-pen using a , and patient performed a return demonstration.  Patient verbalizes understanding.  New patient information sheet given and discussed anaphylaxis, local reactions and answered all questions to patients satisfaction.     Safety vial test was done in the right upper arm, for new Silver vial # 1.   Administered  0.01ml (ID) for SVT.  SVT = 7 mm.   Passed.    Safety vial test was done in the right lower arm, for new Silver vial # 2.   Administered  0.01ml (ID) for SVT.  SVT = 7 mm.   Passed.    Allergy injection/s given and charted on paper allergy flow sheet.  Patient experienced no reaction.  Epi pen is present today.  Patient has a follow-up scheduled for 3 months with LSIS Benton.     Rissa Cash RN

## 2021-04-13 ENCOUNTER — TELEPHONE (OUTPATIENT)
Dept: ALLERGY | Facility: OTHER | Age: 51
End: 2021-04-13

## 2021-04-13 NOTE — TELEPHONE ENCOUNTER
When patient started his allergy shots last Friday, he had questions about his allergy medications.  He asked why he is on two antihistamine medications and if he needs to continue with budesonide rinses BID (and for how long).  Discussed with LISS Benton, and the medications are prescribed the way they are for symptom management.  She suggested he remain on the medications for at least 4 weeks since he is new to allergy shots.  If he is doing well, he can drop down to one antihistamine daily and the budesonide rinses down to once daily.      Patient notified of the above information.  Patient notes that taking Xyzal in the evening still left him groggy in the morning, so he stopped this medication.  He is taking Claritin in the morning and doing the budesonide rinses.  Advised patient he should monitor his symptoms while on allergy shots.  If his symptoms are not well managed, he will likely need to resume taking an antihistamine BID.  Patient verbalized understanding.    Rissa Cash RN

## 2021-04-14 ENCOUNTER — DOCUMENTATION ONLY (OUTPATIENT)
Dept: SLEEP MEDICINE | Facility: HOSPITAL | Age: 51
End: 2021-04-14

## 2021-04-14 DIAGNOSIS — R53.82 CHRONIC FATIGUE: ICD-10-CM

## 2021-04-14 DIAGNOSIS — F41.1 GENERALIZED ANXIETY DISORDER: Primary | ICD-10-CM

## 2021-04-14 DIAGNOSIS — G47.33 OSA (OBSTRUCTIVE SLEEP APNEA): ICD-10-CM

## 2021-04-14 DIAGNOSIS — F33.0 MAJOR DEPRESSIVE DISORDER, RECURRENT EPISODE, MILD (H): ICD-10-CM

## 2021-04-14 NOTE — PROGRESS NOTES
STOP SAV       Name: Shane Rai MRN# [id number]   Age: 50 year old YOB: 1970     Stop Bang questionnaire completed with a score of >3 to allow for HST     Have you been told you snore loudly (louder than talking or loud enough to be heard through doors)? YES    Do you often feel tired, fatigued, or sleepy during the daytime? YES    Has anyone observed you stop breathing during your sleep? YES    Do you have or are you being treated for high blood pressure? YES    Is your BMI greater than 35? NO    Is your neck size circumference 16 inches or greater? NO    Are you over 50 years old? NO    Stop Bang Score (# of yes): 5

## 2021-04-14 NOTE — PROGRESS NOTES
SLEEP HISTORY QUESTIONNAIRE    Please describe the main reason for your sleep appointment? Feeling tired all the time, brain fog snoring    How long has this been a problem? 10 years    Have you been diagnosed with a sleep problem in the past? YES    If so, what? JUVENTINO    What treatment was recommended? CPAP    Have you had a sleep study in the past? YES    If yes, where and when? [place] 4/29 2013    Sleep Habits:   Do you read in bed? Yes  Do you eat in bed? No  Do you watch TV in bed? Yes  Do you work in bed? No  Do you use a phone or computer in bed? Yes    Is you sleep disturbed by:   Bed partner: Yes  Children: No  Noise: Yes   Pets: No  Other:       On two or more nights per week, do you drink alcohol to help you fall asleep?NO    On two or more nights per week, do you take melatonin to help you fall asleep? NO    On two or more nights per week, do you take over the counter medicine to fall asleep?  NO    Do you take drinks with caffeine (coffee, tea, soda, energy drinks)? NO    Do you have 3 or more caffeine drinks in a day? NO    Do you have caffeine drinks within 6 hours of bedtime? NO    Do you smoke or use tobacco? NO    Do you exercise? YES    Sleep Routine:   Using a 24 Hour Clock    What time do you usually get into bed on workdays? 9-10 pm    Weekend/non work days? 11 pm    What time do you get out of bed on workdays? 4:30 am      Weekend/non work days?8am    Do you work the evening or night shift or do your shifts rotate? NO    How long does it usually take to fall to sleep? 40 minutes    How many times do you wake during the night? 2    How much time do you feel that you are awake during the entire night? 1 hours    How long does it take for you to fall back to sleep after you wake up? 15 minutes    Why do you think you wake up? Partner and neighbors dog    What do you do when you wake up? Put in ear plugs    How much sleep do you think you get on work nights? 5-6 hours    How much sleep do you think  you get on weekends/non work days? 7-8 hours    How much sleep do you think you need to feel your best? 8 hours    How many days during a week do you take a nap on average? 0    What is the average length of your naps? 0    Do you feel better after taking a nap? YES    If you could chose the best sleep schedule for you, what time would you go to bed? 10:30pm  What time would you get up? 7 am    Do you read in bed? YES    Do you eat in bed? NO    Do you watch TV in bed? YES    Do you do work in bed? NO    Do you use a computer or phone in bed? YES    Sleep Disruptions?   Leg movements:  Do you ever have restless, crawling, aching or other unusual feelings in your legs? YES    Do you ever wake yourself by kicking your legs during the night? NO    Are the sheets and blankets messed up or tossed about when you get up? NO    Night-time behaviors:   Do you have nightmares or night terrors? YES   How often? One every other week    Have you had times when you were sleep walking? NO    Have you been seen doing anything unusual while you sleep at nights? NO  What?   How often?     Have you ever hurt yourself or someone else while you were sleeping? NO  Please describe:     Do you clench or grind your teeth during the night? no    Sleep Apnea (pauses in breathing during sleep):  Do you wake with a headache in the morning? YES  How often? Once a week    Does your bed partner, family or friends ever say that you snore? YES  How many nights per week do you snore? all  Can snoring be heard outside the bedroom? yes    Do you ever wake yourself up from snoring, gasping or choking? YES    Have you ever been told that you stop breathing or have pauses in your breathing? YES    Do you wake in the morning with a dry throat or mouth? YES    Do you have trouble breathing through your nose? YES    Do you have problems with heartburn, reflux or a hiatal hernia? YES    Which positions do you usually sleep in? (stomach, back, sides, all) back  and sides    Do you use oxygen or any other medical equipment when you sleep? NO    Do members of your family (related by blood) snore? YES    Have any members of your family been diagnosed with with sleep apnea? YES    Do other members of your family have restless leg? NO    Do other members of your family have sleep walking? NO    Have you ever had an accident, or near accident due to sleepiness while driving? NO    Does your sleepiness affect your work on the job or at school? YES    Do you ever fall asleep by accident while doing a task? NO    Have you had sudden muscle weakness when laughing, angry or surprised? YES    Have you ever been unable to move your body when falling asleep or waking up? NO    Do you ever have trouble  your dreams from real life events? NO  Please describe:     Physical Health: (including illness and injury): During the past 30 days, on how many days was your physical health not good? 1530 days     Mental Health: (including stress, depression, and problems with emotions): During the last 30 days, how may days was your mental health not good? 30 days.     During the past 30 days, on how many days did poor physical or mental health keep you from doing your usual activities? This might be self-care, work, or play? 30 days.     Social History:   Marital status:     Who lives in your home with you? Wife daughter dog    Mother (alive or dead)? alive If has , from what?   Father (alive or dead)? alive If has , from what?     Siblings: YES  Have any ? NO  If so, from what?     Currently working? YES  If yes, work: [employer]  Former jobs: [employer]     Sleepiness Scale:   Sitting and reading 3   Watching TV 2   Sitting in a public place 1   Riding in a car 2   Lying down to rest in the afternoon 2   Sitting and talking to someone 0   Sitting quietly after a lunch without alcohol 1   In a car, stopping for a few minutes in traffic 0        Surgical History:   Past Surgical History:   Procedure Laterality Date     deviated septum[       ESOPHAGOSCOPY, GASTROSCOPY, DUODENOSCOPY (EGD), COMBINED  2/28/2014    Procedure: COMBINED ESOPHAGOSCOPY, GASTROSCOPY, DUODENOSCOPY (EGD);  UPPER ENDOSCOPY WITH BIOPSY;  Surgeon: Dima Peraza MD;  Location: [facility]     VASECTOMY         Medical Conditions:   Past Medical History:   Diagnosis Date     Gastro-oesophageal reflux disease      Hypothyroidism        Medications:   Current Outpatient Medications   Medication Sig     albuterol (PROAIR HFA/PROVENTIL HFA/VENTOLIN HFA) 108 (90 Base) MCG/ACT inhaler Inhale 2 puffs into the lungs every 6 hours     budesonide (PULMICORT) 0.5 MG/2ML neb solution Squirt entire vial into previously made pat med saline bottle, mix, and irrigate each nostril until entire bottle empty. BID.     clobetasol (TEMOVATE) 0.05 % ointment Apply  topically 2 times daily.     desonide (DESOWEN) 0.05 % external cream Apply topically 2 times daily     dexlansoprazole (DEXILANT) 30 MG CPDR CR capsule Take 30 mg by mouth daily     EPINEPHrine (ANY BX GENERIC EQUIV) 0.3 MG/0.3ML injection 2-pack Inject 0.3 mLs (0.3 mg) into the muscle as needed for anaphylaxis     EPINEPHrine (ANY BX GENERIC EQUIV) 0.3 MG/0.3ML injection 2-pack Inject 0.3 mg into the muscle as needed for anaphylaxis     fluticasone (FLONASE) 50 MCG/ACT nasal spray Spray 2 sprays into both nostrils daily     HYDROcodone-acetaminophen (NORCO) 5-325 MG per tablet Take 1-2 tablets by mouth every 4 hours as needed for moderate to severe pain (Patient not taking: Reported on 3/11/2021)     levocetirizine (XYZAL) 5 MG tablet Take 1 tablet (5 mg) by mouth every evening (Patient not taking: Reported on 3/19/2021)     LEVOTHYROXINE SODIUM PO Take 125 mcg by mouth      loratadine (CLARITIN) 10 MG tablet Take 1 tablet (10 mg) by mouth every morning (Patient not taking: Reported on 3/19/2021)     ORDER FOR ALLERGEN IMMUNOTHERAPY Patient to  start allergy shots.  Follow standard build-up protocols.     No current facility-administered medications for this visit.        Are you currently having any of the following symptoms?   General:   Obvious weight gain or loss YES  Fever, chills or sweats NO  Drug allergies:     Eyes:   Changes in vision YES  Blind spots NO  Double vision NO  Other bluriness    Ear, Nose and Throat:   Ear pain NO  Sore throat YES  Sinus pain NO  Post-nasal drip YES  Runny nose NO  Bloody nose NO    Heart:   Rapid or irregular heart beat NO  Chest pain or pressure NO  Out of breath when lying down NO  Swelling in feet or legs NO  High blood pressure YES  Heart disease NO    Nervous system   Headaches YES  Weakness in arms or legs YES  Numbness in arms of legs NO  Other:     Skin  Rashes YES  New moles or skin changes NO  Other     Lungs  Shortness of breath at rest NO  Shortness of breath with activity YES  Dry cough YES  Coughing up mucous or phlegm YES  Coughing up blood NO  Wheezing when breathing NO    Lymph System  Swollen lymph nodes NO  New lumps or bumps NO  Changes in breasts or discharge NO    Digestive System   Nausea or vomiting YES  Loose or watery stools NO  Hard, dry stools (constipation) NO  Fat or grease in stools NO  Blood in stools NO  Stools are black or bloody NO  Abdominal (belly) pain NO    Urinary Tract   Pain when you urinate (pee) NO  Blood in your urine NO  Urinate (pee) more than normal NO  Irregular periods NO    Muscles and bones   Muscle pain YES  Joint or bone pain YES  Swollen joints NO  Other     Glands  Increased thirst or urination NO  Diabetes NO  Morning glucose:   Afternoon glucose:     Mental Health  Depression NO  Anxiety NO  Other mental health issues:

## 2021-04-14 NOTE — PROGRESS NOTES
Chart review prior to sleep testing.    Patient Summary:  50 year old yo male who is referred for chronic daytime fatigue, concern for sleep disordered breathing.    Patient Active Problem List    Diagnosis Date Noted     Herpes zoster complication 06/08/2013     Priority: Medium     Backache 08/27/2012     Priority: Medium     IMO Update       Hypothyroidism 06/29/2012     Priority: Medium     Allergic rhinitis 01/05/2011     Priority: Medium     IMO Update       Adjustment disorder with depressed mood 09/08/2008     Priority: Medium     More approapriate diagnosis is major depression due to long term hx of depression.       Generalized anxiety disorder 09/08/2008     Priority: Medium     Major depressive disorder, recurrent episode, mild (H) 09/08/2008     Priority: Medium     IMO Update 10/11       Esophageal reflux 03/22/2007     Priority: Medium     Abdominal pain, epigastric 02/27/2007     Priority: Medium     Anxiety state 04/26/2006     Priority: Medium     IMO Update 10/11         Current Outpatient Medications   Medication     albuterol (PROAIR HFA/PROVENTIL HFA/VENTOLIN HFA) 108 (90 Base) MCG/ACT inhaler     budesonide (PULMICORT) 0.5 MG/2ML neb solution     clobetasol (TEMOVATE) 0.05 % ointment     desonide (DESOWEN) 0.05 % external cream     dexlansoprazole (DEXILANT) 30 MG CPDR CR capsule     EPINEPHrine (ANY BX GENERIC EQUIV) 0.3 MG/0.3ML injection 2-pack     EPINEPHrine (ANY BX GENERIC EQUIV) 0.3 MG/0.3ML injection 2-pack     fluticasone (FLONASE) 50 MCG/ACT nasal spray     HYDROcodone-acetaminophen (NORCO) 5-325 MG per tablet     levocetirizine (XYZAL) 5 MG tablet     LEVOTHYROXINE SODIUM PO     loratadine (CLARITIN) 10 MG tablet     ORDER FOR ALLERGEN IMMUNOTHERAPY     No current facility-administered medications for this visit.        STOP-BANG score of 5, with unknown neck circumference.  [place] score of 11.  BMI of Estimated body mass index is 27.98 kg/m  as calculated from the following:     "Height as of 3/19/21: 1.778 m (5' 10\").    Weight as of 3/19/21: 88.5 kg (195 lb).     Per questionnaire: \"Feeling tired all the time, brain fog snoring\"    Yes for prior sleep testing, [facility] in [place] 4/29/2013.    Unknown weight.  .5 minutes.  AHI 12.9.  Mean SpO2 94%, светлана SpO2 81%.  SpO2 <= 88% for 0.6% of recording.    Last office visit with Dr. Gonzalez on 8/22/2013 - follow-up after trial of CPAP, used 6+ hours nightly, no reported improvement in daytime fatigue.  Low clinical concern for narcolepsy.    Caffeine use:  No for 3+ per day.  No for within 6 hours of bed.    Tobacco use: No    Sleep pattern:  Workdays.  9-10pm to 4:30am, total sleep time 5-6 hours.  Weekends.  11pm - 8am, total sleep time 8 hours.  Time to fall asleep: ~40 minutes.  Awakenings: 2 times per night, 15 minutes to return to sleep.  Napping.  0 days per week, - hours per nap.    Yes for RLS screen.  No for sleep walking.  No for dream enactment behavior.  No for bruxism.    Yes for morning headaches.  Yes for snoring.  Yes for observed apnea.  Yes for FHx of JUVENTINO.    Yes to cataplexy screen.    SHx:  , lives with wife and daughter.  Works as [employer] for [employer].    A/P:  1.)  History of mild JUVENTINO with reported minimal improvement in fatigue with CPAP 8/22/2013.  2.)  Borderline elevated [place], possible positive cataplexy screen.   - Would consider repeat testing for JUVENTINO with home sleep testing and if no significant findings, consider if indication for full hypersomnia testing.  Orders placed for HST.      ---  This note was written with the assistance of the Dragon voice-dictation technology software. The final document, although reviewed, may contain errors. For corrections, please contact the office.    Calixto Michaels MD    Sleep Medicine  [facility]  ([phone])  [facility]  ([phone])    "

## 2021-04-16 ENCOUNTER — ALLIED HEALTH/NURSE VISIT (OUTPATIENT)
Dept: ALLERGY | Facility: OTHER | Age: 51
End: 2021-04-16
Attending: PHYSICIAN ASSISTANT
Payer: COMMERCIAL

## 2021-04-16 DIAGNOSIS — J30.89 PERENNIAL ALLERGIC RHINITIS: Primary | ICD-10-CM

## 2021-04-16 PROCEDURE — 95117 IMMUNOTHERAPY INJECTIONS: CPT

## 2021-04-16 NOTE — PROGRESS NOTES
Prior to injection verified pt identity using pt name and date of birth.    Allergy injection/s given and charted on paper allergy flow sheet.  Patient held 30 min and had no reaction noted.    Clyde Blackwood RN on 4/16/2021 at 4:49 PM

## 2021-04-23 ENCOUNTER — ALLIED HEALTH/NURSE VISIT (OUTPATIENT)
Dept: ALLERGY | Facility: OTHER | Age: 51
End: 2021-04-23
Attending: PHYSICIAN ASSISTANT
Payer: COMMERCIAL

## 2021-04-23 DIAGNOSIS — J30.89 PERENNIAL ALLERGIC RHINITIS: Primary | ICD-10-CM

## 2021-04-23 PROCEDURE — 95117 IMMUNOTHERAPY INJECTIONS: CPT

## 2021-04-23 NOTE — PROGRESS NOTES
Prior to injection, verified patient's identity using patient's name and date of birth.    Allergy injection/s given and charted on paper allergy flow sheet.  Patient stayed 30 minutes for observation, no reaction noted.      Rissa Cash RN

## 2021-04-30 ENCOUNTER — ALLIED HEALTH/NURSE VISIT (OUTPATIENT)
Dept: ALLERGY | Facility: OTHER | Age: 51
End: 2021-04-30
Attending: PHYSICIAN ASSISTANT
Payer: COMMERCIAL

## 2021-04-30 DIAGNOSIS — J30.89 PERENNIAL ALLERGIC RHINITIS: Primary | ICD-10-CM

## 2021-04-30 PROCEDURE — 95117 IMMUNOTHERAPY INJECTIONS: CPT

## 2021-04-30 NOTE — PROGRESS NOTES
Prior to injection, verified patient's identity using patient's name and date of birth.    Allergy injection/s given and charted on paper allergy flow sheet.  Patient held for 30 minutes for observation, no reaction noted.    Rissa Cash RN

## 2021-05-05 ENCOUNTER — TELEPHONE (OUTPATIENT)
Dept: SLEEP MEDICINE | Facility: HOSPITAL | Age: 51
End: 2021-05-05

## 2021-05-07 ENCOUNTER — ALLIED HEALTH/NURSE VISIT (OUTPATIENT)
Dept: ALLERGY | Facility: OTHER | Age: 51
End: 2021-05-07
Attending: PHYSICIAN ASSISTANT
Payer: COMMERCIAL

## 2021-05-07 DIAGNOSIS — J30.2 SEASONAL ALLERGIC RHINITIS, UNSPECIFIED TRIGGER: Primary | ICD-10-CM

## 2021-05-07 PROCEDURE — 95117 IMMUNOTHERAPY INJECTIONS: CPT

## 2021-05-07 NOTE — PROGRESS NOTES
Prior to injection verified pt identity using pt name and date of birth.    Allergy injection/s given and charted on paper allergy flow sheet.  Patient was held 30 minutes after his injection and experienced no reactions.    Clyde Blackwood RN on 5/7/2021 at 4:24 PM

## 2021-05-11 DIAGNOSIS — J30.89 PERENNIAL ALLERGIC RHINITIS: ICD-10-CM

## 2021-05-11 NOTE — TELEPHONE ENCOUNTER
Budesonide  Last Written Prescription Date: 4/6/21  Last Fill Quantity: 60 ml # of Refills: 0  Last Office Visit: 3/19/21

## 2021-05-12 RX ORDER — BUDESONIDE 0.5 MG/2ML
INHALANT ORAL
Qty: 60 ML | Refills: 0 | Status: SHIPPED | OUTPATIENT
Start: 2021-05-12 | End: 2021-10-01

## 2021-05-18 ENCOUNTER — OFFICE VISIT (OUTPATIENT)
Dept: SLEEP MEDICINE | Facility: HOSPITAL | Age: 51
End: 2021-05-18
Attending: FAMILY MEDICINE
Payer: COMMERCIAL

## 2021-05-18 DIAGNOSIS — G47.33 OSA (OBSTRUCTIVE SLEEP APNEA): Primary | ICD-10-CM

## 2021-05-18 PROCEDURE — G0399 HOME SLEEP TEST/TYPE 3 PORTA: HCPCS

## 2021-05-18 PROCEDURE — G0399 HOME SLEEP TEST/TYPE 3 PORTA: HCPCS | Mod: 26 | Performed by: FAMILY MEDICINE

## 2021-05-19 ENCOUNTER — DOCUMENTATION ONLY (OUTPATIENT)
Dept: SLEEP MEDICINE | Facility: HOSPITAL | Age: 51
End: 2021-05-19
Attending: FAMILY MEDICINE
Payer: COMMERCIAL

## 2021-05-19 PROCEDURE — G0399 HOME SLEEP TEST/TYPE 3 PORTA: HCPCS

## 2021-05-20 NOTE — PROGRESS NOTES
This HSAT was performed using a Noxturnal T3 device which recorded snore, sound, movement activity, body position, nasal pressure, oronasal thermal airflow, pulse, oximetry and both chest and abdominal respiratory effort. HSAT data was restricted to the time patient states they were in bed.     HSAT was scored using 1B 4% hypopnea rule.     HST AHI (Non-PAT): 20.4     Snoring was reported as moderate and loud.  Time with SpO2 below 89% was 16.5 minutes.   Overall signal quality was good     Pt will follow up with sleep provider to determine appropriate therapy.

## 2021-05-25 ENCOUNTER — DOCUMENTATION ONLY (OUTPATIENT)
Dept: SLEEP MEDICINE | Facility: CLINIC | Age: 51
End: 2021-05-25

## 2021-05-25 ENCOUNTER — VIRTUAL VISIT (OUTPATIENT)
Dept: SLEEP MEDICINE | Facility: HOSPITAL | Age: 51
End: 2021-05-25
Attending: FAMILY MEDICINE
Payer: COMMERCIAL

## 2021-05-25 DIAGNOSIS — G47.33 OSA (OBSTRUCTIVE SLEEP APNEA): Primary | ICD-10-CM

## 2021-05-25 PROCEDURE — 99204 OFFICE O/P NEW MOD 45 MIN: CPT | Mod: 95 | Performed by: FAMILY MEDICINE

## 2021-05-25 NOTE — PROGRESS NOTES
"Shane Rai is a 51 year old male who is being evaluated via a billable video visit.       The patient has been notified of following:      \"This video visit will be conducted via a call between you and your physician/provider. We have found that certain health care needs can be provided without the need for an in-person physical exam.  This service lets us provide the care you need with a video conversation.  If a prescription is necessary we can send it directly to your pharmacy.  If lab work is needed we can place an order for that and you can then stop by our lab to have the test done at a later time.     Video visits are billed at different rates depending on your insurance coverage.  Please reach out to your insurance provider with any questions.     If during the course of the call the physician/provider feels a video visit is not appropriate, you will not be charged for this service.\"     Patient has given verbal consent for Video visit? Yes  How would you like to obtain your AVS? Mail a copy  If you are dropped from the video visit, the video invite should be resent to: Text to cell phone: [phone]  Will anyone else be joining your video visit? No  If patient encounters technical issues they should call [phone]      Video-Visit Details     Type of service:  Video Visit     Video Start Time: 0830  Video End Time: 0900    Originating Location (pt. Location): Home     Distant Location (provider location):  [facility]      Platform used for Video Visit: LifeV[employer]    Virtual visit for review of home sleep testing results, daytime fatigue.     Assessment:  - Moderate obstructive sleep apnea.  - Very strong positional component (supine AHI 78.7 / lateral AHI 3.3 with 22.6% of recording in supine sleep position)  - Sleep associated hypoxemia was present.  - Primary sleep concerns of socially disruptive snoring, daytime fatigue    Plan:  - We reviewed treatment options of " "retrying CPAP, positional therapy.  - We agreed to proceed with CPAP auto-titrate 5-15 cm H2O.    SUBJECTIVE:  Shane Rai is a 51 year old year old male.    STOP-BANG score of 5, with unknown neck circumference.  [place] score of 11.  BMI of Estimated body mass index is 27.98 kg/m  as calculated from the following:    Height as of 3/19/21: 1.778 m (5' 10\").    Weight as of 3/19/21: 88.5 kg (195 lb).      Per questionnaire: \"Feeling tired all the time, brain fog snoring\"     Yes for prior sleep testing, PSG in [place] 4/29/2013.    Unknown weight.  .5 minutes.  AHI 12.9.  Mean SpO2 94%, светлана SpO2 81%.  SpO2 <= 88% for 0.6% of recording.     Last office visit with Dr. Gonzalez on 8/22/2013 - follow-up after trial of CPAP, used 6+ hours nightly, no reported improvement in daytime fatigue.  Low clinical concern for narcolepsy.  Barriers to use of CPAP were using in winter and excessively cold air.     Caffeine use:  No for 3+ per day.  No for within 6 hours of bed.     Tobacco use: No     Sleep pattern:  Workdays.  9-10pm to 4:30am, total sleep time 5-6 hours.  Weekends.  11pm - 8am, total sleep time 8 hours.  Time to fall asleep: ~40 minutes.  Awakenings: 2 times per night, 15 minutes to return to sleep.  Napping.  0 days per week, - hours per nap.     Yes for RLS screen.  No for sleep walking.  No for dream enactment behavior.  No for bruxism.     Yes for morning headaches.  Yes for snoring.  Yes for observed apnea.  Yes for FHx of JUVENTINO.     Yes to cataplexy screen.     SHx:  , lives with wife and daughter.  Works as  for [employer].    HOME SLEEP STUDY INTERPRETATION     Patient: Shane Rai  MRN: [id number]  YOB: 1970  Study Date: 5/18/2021  Referring Provider: Sam Pettit  Ordering Provider: Calixto Michaels MD     Indications for Home Study: Shane Rai is a 51 year old male with a history of JUVENTINO, hypersomnia, BENJA, MDD who presents with symptoms " "hypersomnia.     Prior Sleep Testin2013 - PSG.  Unknown weight.  .5 minutes.  AHI 12.9.  Mean SpO2 94%, светлана SpO2 81%.  SpO2 <= 88% for 0.6% of recording.     Estimated body mass index is 27.98 kg/m  as calculated from the following:    Height as of 3/19/21: 1.778 m (5' 10\").    Weight as of 3/19/21: 88.5 kg (195 lb).  [place] Sleepiness Scale:   STOP-BAN/8     Data: A full night home sleep study was performed recording the standard physiologic parameters including body position, movement, sound, nasal pressure, thermal oral airflow, chest and abdominal movements with respiratory inductance plethysmography, and oxygen saturation by pulse oximetry. Pulse rate was estimated by oximetry recording. This study was considered adequate based on > 4 hours of quality oximetry and respiratory recording. As specified by the AASM Manual for the Scoring of Sleep and Associated events, version 2.3, Rule VIII.D 1B, 4% oxygen desaturation scoring for hypopneas is used as a standard of care on all home sleep apnea testing.     Analysis Time:  394.9 minutes     Respiration:   Sleep Associated Hypoxemia: sustained hypoxemia was not present. Average oxygen saturation was 93.2%.  Time with saturation less than or equal to 88% was 13.3 minutes. The lowest oxygen saturation was 79%.   Snoring: Snoring was present.  Respiratory events: The home study revealed a presence of 71 obstructive apneas and 0 mixed and central apneas. There were 63 hypopneas resulting in a combined apnea/hypopnea index [AHI] of 20.4 events per hour.  AHI was 78.7 per hour supine, - per hour prone, 3.3 per hour on left side, and - per hour on right side.   Pattern: Excluding events noted above, respiratory rate and pattern was Normal.     Position: Percent of time spent: supine - 22.6%, prone - 0%, on left - 77.4%, on right - 0%.     Heart Rate: By pulse oximetry normal rate was noted.      Assessment:   Moderate obstructive sleep " apnea.  Very strong positional component (supine AHI 78.7 / lateral AHI 3.3 with 22.6% of recording in supine sleep position)  Sleep associated hypoxemia was present.     Recommendations:  Consider auto-CPAP at 5-15 cmH2O, oral appliance therapy, positional therapy or polysomnography with full night PAP titration.  Suggest optimizing sleep hygiene and avoiding sleep deprivation.  Weight management.     Diagnosis Code(s): Obstructive Sleep Apnea G47.33, Hypoxemia G47.36       Past medical history:    Patient Active Problem List    Diagnosis Date Noted     Herpes zoster complication 06/08/2013     Priority: Medium     Backache 08/27/2012     Priority: Medium     IMO Update       Hypothyroidism 06/29/2012     Priority: Medium     Allergic rhinitis 01/05/2011     Priority: Medium     IMO Update       Adjustment disorder with depressed mood 09/08/2008     Priority: Medium     More approapriate diagnosis is major depression due to long term hx of depression.       Generalized anxiety disorder 09/08/2008     Priority: Medium     Major depressive disorder, recurrent episode, mild (H) 09/08/2008     Priority: Medium     IMO Update 10/11       Esophageal reflux 03/22/2007     Priority: Medium     Abdominal pain, epigastric 02/27/2007     Priority: Medium     Anxiety state 04/26/2006     Priority: Medium     IMO Update 10/11         10 point ROS of systems including Constitutional, Eyes, Respiratory, Cardiovascular, Gastroenterology, Genitourinary, Integumentary, Muscularskeletal, Psychiatric were all negative except for pertinent positives noted in my HPI.    Current Outpatient Medications   Medication Sig Dispense Refill     albuterol (PROAIR HFA/PROVENTIL HFA/VENTOLIN HFA) 108 (90 Base) MCG/ACT inhaler Inhale 2 puffs into the lungs every 6 hours       budesonide (PULMICORT) 0.5 MG/2ML neb solution Squirt entire vial into previously made pat med saline bottle, mix, and irrigate each nostril until entire bottle empty. BID.  60 mL 0     clobetasol (TEMOVATE) 0.05 % ointment Apply  topically 2 times daily.       desonide (DESOWEN) 0.05 % external cream Apply topically 2 times daily       dexlansoprazole (DEXILANT) 30 MG CPDR CR capsule Take 30 mg by mouth daily       EPINEPHrine (ANY BX GENERIC EQUIV) 0.3 MG/0.3ML injection 2-pack Inject 0.3 mLs (0.3 mg) into the muscle as needed for anaphylaxis 0.6 mL 1     EPINEPHrine (ANY BX GENERIC EQUIV) 0.3 MG/0.3ML injection 2-pack Inject 0.3 mg into the muscle as needed for anaphylaxis       fluticasone (FLONASE) 50 MCG/ACT nasal spray Spray 2 sprays into both nostrils daily 16 g 12     HYDROcodone-acetaminophen (NORCO) 5-325 MG per tablet Take 1-2 tablets by mouth every 4 hours as needed for moderate to severe pain (Patient not taking: Reported on 3/11/2021) 15 tablet 0     levocetirizine (XYZAL) 5 MG tablet Take 1 tablet (5 mg) by mouth every evening (Patient not taking: Reported on 3/19/2021) 120 tablet prn     LEVOTHYROXINE SODIUM PO Take 125 mcg by mouth        loratadine (CLARITIN) 10 MG tablet Take 1 tablet (10 mg) by mouth every morning (Patient not taking: Reported on 3/19/2021) 120 tablet prn     ORDER FOR ALLERGEN IMMUNOTHERAPY Patient to start allergy shots.  Follow standard build-up protocols. 10 mL PRN       OBJECTIVE:  There were no vitals taken for this visit.    Physical Exam     ---  This note was written with the assistance of the Dragon voice-dictation technology software. The final document, although reviewed, may contain errors. For corrections, please contact the office.    Calixto Michaels MD    Sleep Medicine  [facility]  ([phone])  [facility]  ([phone])

## 2021-05-25 NOTE — PROGRESS NOTES
"Shane is a 51 year old who is being evaluated via a billable video visit.      How would you like to obtain your AVS? MyChart  If the video visit is dropped, the invitation should be resent by: Text to cell phone: [phone]  Will anyone else be joining your video visit? No  {If patient encounters technical issues they should call [phone] :[id number]}    Video Start Time: {video visit start/end time for provider to select:152948}    {PROVIDER CHARTING PREFERENCE:631393}    Subjective   Shane is a 51 year old who presents for the following health issues {ACCOMPANIED BY STATEMENT (Optional):304736}    HPI     ***    Review of Systems   {ROS COMP (Optional):825280}      Objective           Vitals:  No vitals were obtained today due to virtual visit.    Physical Exam   {video visit exam brief selected:479107::\"GENERAL: Healthy, alert and no distress\",\"EYES: Eyes grossly normal to inspection.  No discharge or erythema, or obvious scleral/conjunctival abnormalities.\",\"RESP: No audible wheeze, cough, or visible cyanosis.  No visible retractions or increased work of breathing.  \",\"SKIN: Visible skin clear. No significant rash, abnormal pigmentation or lesions.\",\"NEURO: Cranial nerves grossly intact.  Mentation and speech appropriate for age.\",\"PSYCH: Mentation appears normal, affect normal/bright, judgement and insight intact, normal speech and appearance well-groomed.\"}    {Diagnostic Test Results (Optional):418638}    {AMBULATORY ATTESTATION (Optional):002352}        Video-Visit Details    Type of service:  Video Visit    Video End Time:{video visit start/end time for provider to select:612727}    Originating Location (pt. Location): {video visit patient location:830109::\"Home\"}    Distant Location (provider location):  [facility]     Platform used for Video Visit: {Virtual Visit Platforms:678463::\"AmWell\"}    "

## 2021-05-25 NOTE — PROCEDURES
"HOME SLEEP STUDY INTERPRETATION    Patient: Shane Rai  MRN: [id number]  YOB: 1970  Study Date: 2021  Referring Provider: Sam Pettit  Ordering Provider: Calixto Michaels MD     Indications for Home Study: Shane Rai is a 51 year old male with a history of JUVENTINO, hypersomnia, BENJA, MDD who presents with symptoms hypersomnia.    Prior Sleep Testin2013 - PS[facility].  Unknown weight.  .5 minutes.  AHI 12.9.  Mean SpO2 94%, светлана SpO2 81%.  SpO2 <= 88% for 0.6% of recording.    Estimated body mass index is 27.98 kg/m  as calculated from the following:    Height as of 3/19/21: 1.778 m (5' 10\").    Weight as of 3/19/21: 88.5 kg (195 lb).  [place] Sleepiness Scale:   STOP-BAN/8    Data: A full night home sleep study was performed recording the standard physiologic parameters including body position, movement, sound, nasal pressure, thermal oral airflow, chest and abdominal movements with respiratory inductance plethysmography, and oxygen saturation by pulse oximetry. Pulse rate was estimated by oximetry recording. This study was considered adequate based on > 4 hours of quality oximetry and respiratory recording. As specified by the AASM Manual for the Scoring of Sleep and Associated events, version 2.3, Rule VIII.D 1B, 4% oxygen desaturation scoring for hypopneas is used as a standard of care on all home sleep apnea testing.    Analysis Time:  394.9 minutes    Respiration:   Sleep Associated Hypoxemia: sustained hypoxemia was not present. Average oxygen saturation was 93.2%.  Time with saturation less than or equal to 88% was 13.3 minutes. The lowest oxygen saturation was 79%.   Snoring: Snoring was present.  Respiratory events: The home study revealed a presence of 71 obstructive apneas and 0 mixed and central apneas. There were 63 hypopneas resulting in a combined apnea/hypopnea index [AHI] of 20.4 events per hour.  AHI was 78.7 per hour supine, - per hour prone, 3.3 per " hour on left side, and - per hour on right side.   Pattern: Excluding events noted above, respiratory rate and pattern was Normal.    Position: Percent of time spent: supine - 22.6%, prone - 0%, on left - 77.4%, on right - 0%.    Heart Rate: By pulse oximetry normal rate was noted.     Assessment:   Moderate obstructive sleep apnea.  Very strong positional component (supine AHI 78.7 / lateral AHI 3.3 with 22.6% of recording in supine sleep position)  Sleep associated hypoxemia was present.    Recommendations:  Consider auto-CPAP at 5-15 cmH2O, oral appliance therapy, positional therapy or polysomnography with full night PAP titration.  Suggest optimizing sleep hygiene and avoiding sleep deprivation.  Weight management.    Diagnosis Code(s): Obstructive Sleep Apnea G47.33, Hypoxemia G47.36    Calixto Michaels MD, MD, May 25, 2021   Diplomate, American Board of Family Medicine, Sleep Medicine

## 2021-06-01 ENCOUNTER — ALLIED HEALTH/NURSE VISIT (OUTPATIENT)
Dept: ALLERGY | Facility: OTHER | Age: 51
End: 2021-06-01
Attending: PHYSICIAN ASSISTANT
Payer: COMMERCIAL

## 2021-06-01 DIAGNOSIS — J30.89 PERENNIAL ALLERGIC RHINITIS: Primary | ICD-10-CM

## 2021-06-01 PROCEDURE — 95117 IMMUNOTHERAPY INJECTIONS: CPT

## 2021-06-01 NOTE — PROGRESS NOTES
Prior to injection verified pt identity using pt name and date of birth.    Allergy injection/s given and charted on paper allergy flow sheet.  Patient held 30 minutes, no reaction noted.    Clyde Blackwood RN on 6/1/2021 at 4:39 PM

## 2021-06-10 ENCOUNTER — ALLIED HEALTH/NURSE VISIT (OUTPATIENT)
Dept: ALLERGY | Facility: OTHER | Age: 51
End: 2021-06-10
Attending: PHYSICIAN ASSISTANT
Payer: COMMERCIAL

## 2021-06-10 DIAGNOSIS — J30.89 PERENNIAL ALLERGIC RHINITIS: Primary | ICD-10-CM

## 2021-06-10 PROCEDURE — 95117 IMMUNOTHERAPY INJECTIONS: CPT

## 2021-06-10 NOTE — PROGRESS NOTES
Prior to injection verified pt identity using pt name and date of birth.    Allergy injection/s given and charted on paper allergy flow sheet.  Patient left AMA, signing form, not staying for the observation period.    Clyde Blackwood RN on 6/10/2021 at 4:01 PM

## 2021-06-11 ENCOUNTER — DOCUMENTATION ONLY (OUTPATIENT)
Dept: SLEEP MEDICINE | Facility: HOSPITAL | Age: 51
End: 2021-06-11

## 2021-06-11 NOTE — PROGRESS NOTES
Patient was offered choice of vendor and chose [facility].  Patient Shane Rai was set up at [facility] on Eva 10, 2021. Patient received a Resmed AirSense 10 Auto. Pressures were set at 5-15 cm H2O.   Patient s ramp is Off and FLEX/EPR is EPR, 2.  Patient received a Resmed Mask name: Airfit N30i  Nasal mask size Medium, heated tubing and heated humidifier.  Patient does not need to meet compliance. Patient has a follow up on 07/27/2021 with Dr. Michaels.    Con Gamble

## 2021-06-17 ENCOUNTER — ALLIED HEALTH/NURSE VISIT (OUTPATIENT)
Dept: ALLERGY | Facility: OTHER | Age: 51
End: 2021-06-17
Attending: PHYSICIAN ASSISTANT
Payer: COMMERCIAL

## 2021-06-17 DIAGNOSIS — J30.89 PERENNIAL ALLERGIC RHINITIS: Primary | ICD-10-CM

## 2021-06-17 PROCEDURE — 95117 IMMUNOTHERAPY INJECTIONS: CPT

## 2021-06-17 NOTE — PROGRESS NOTES
Prior to injection, verified patient's identity using patient's name and date of birth.    Patient states today that he has had a bit of throat irritation.  This is not a new symptom for him, but he wondered if it could be related to allergies or something else.  He recently had an  upper scope done, and an area of irritation was noticed.  He is awaiting results from the scope, which can take 2 to 3 weeks.  He denies any breathing issues or other symptoms.  He states it feels like when the air is dry.  He also notes that when he exhales, it is more irritating and it sometimes makes him cough.  He is taking an antihistamine daily.  Advised patient that on allergy shot days, if he needs to, he can take an additional antihistamine (one in the morning and one at night).  Also advised that if the throat irritation worsens after he leaves, he should call and let us know so his ENT provider can be notified to see if any changes to his immunotherapy treatment need to be made.  Patient verbalized understanding.    Allergy injection/s given and charted on paper allergy flow sheet.  Patient held 30 minutes for observation, no reaction noted.    Rissa Cash RN

## 2021-06-18 ENCOUNTER — OFFICE VISIT (OUTPATIENT)
Dept: OTOLARYNGOLOGY | Facility: OTHER | Age: 51
End: 2021-06-18
Attending: PHYSICIAN ASSISTANT
Payer: COMMERCIAL

## 2021-06-18 VITALS
SYSTOLIC BLOOD PRESSURE: 134 MMHG | WEIGHT: 195 LBS | OXYGEN SATURATION: 96 % | DIASTOLIC BLOOD PRESSURE: 72 MMHG | HEART RATE: 80 BPM | BODY MASS INDEX: 27.92 KG/M2 | TEMPERATURE: 98.1 F | HEIGHT: 70 IN

## 2021-06-18 DIAGNOSIS — G47.33 OSA (OBSTRUCTIVE SLEEP APNEA): ICD-10-CM

## 2021-06-18 DIAGNOSIS — Z91.018 FOOD ALLERGY: ICD-10-CM

## 2021-06-18 DIAGNOSIS — T78.1XXA POLLEN-FOOD ALLERGY, INITIAL ENCOUNTER: ICD-10-CM

## 2021-06-18 DIAGNOSIS — J30.89 PERENNIAL ALLERGIC RHINITIS: Primary | ICD-10-CM

## 2021-06-18 PROCEDURE — 99213 OFFICE O/P EST LOW 20 MIN: CPT | Performed by: PHYSICIAN ASSISTANT

## 2021-06-18 ASSESSMENT — MIFFLIN-ST. JEOR: SCORE: 1750.76

## 2021-06-18 ASSESSMENT — PAIN SCALES - GENERAL: PAINLEVEL: NO PAIN (0)

## 2021-06-18 NOTE — NURSING NOTE
"Chief Complaint   Patient presents with     Allergies     Pt is here for a f/u immunotherapy injections.       Initial /72 (Cuff Size: Adult Regular)   Pulse 80   Temp 98.1  F (36.7  C) (Tympanic)   Ht 1.778 m (5' 10\")   Wt 88.5 kg (195 lb)   SpO2 96%   BMI 27.98 kg/m   Estimated body mass index is 27.98 kg/m  as calculated from the following:    Height as of this encounter: 1.778 m (5' 10\").    Weight as of this encounter: 88.5 kg (195 lb).  Medication Reconciliation: complete  Joan Mendenhall LPN    "

## 2021-06-18 NOTE — PATIENT INSTRUCTIONS
Continue with allergy injections.   Maintain weekly injections.   Continue with Claritin daily   Consider additional medications if worsening symptoms-   Try Flonase 1 spray to each nostril daily.     ?pathology report from scope- eosinophils. (? Eosinophilic esophagitis vs. Reflux)       Thank you for allowing Krupa Fowler PA-C and our ENT team to participate in your care.  If your medications are too expensive, please give the nurse a call.  We can possibly change this medication.  If you have a scheduling or an appointment question please contact our Health Unit Coordinator at [phone], Ext. [phone].    ALL nursing questions or concerns can be directed to your ENT nurse at: [phone] [place]

## 2021-06-18 NOTE — PROGRESS NOTES
Chief Complaint   Patient presents with     Allergies     Pt is here for a f/u immunotherapy injections.       Patient returns for MQT  Last seen by Dr. Devlin on 3/11/21  He has not started on rinses, antihistamines.   Shane has rinsed before in the past and felt that some would remain present.   He has been using Flonase and had some nasal burning. He has since felt the sensation with some relief. He has tried Nasacort in past.   He was started on BID AH, but was fatigued. He is on Claritin daily. Energy has been better   He has felt like an itch in his lungs. He felt like a tongue/ sensation.        MQT- 3/19/21  Dilution #6- Birch  Dilution #5-Grass, oak, mold, cat, dog, dust  Dilution #2- weeds, maple, elm, cottonwood, molds.      Food Panel completed- Pending.   No history of perennial allergic rhinitis in 2012 he underwent skin testing with Dr. Tomas that showed high sensitivity to tree grass cocklebur mugwort mild dust  He had covid in mid December and still notes hyoposmia and occasional shortness of breath   He notes occasion choking with oral intake     Apples, cherries, almonds all based on prior testing.  No hx of anaphylaxis with food intake  With green apples he had oral swelling of the lips, no anaphylaxis  Juanita cherries caused lip swelling, no other symptoms  No problems with almond intake    Past Medical History:   Diagnosis Date     Gastro-oesophageal reflux disease      Hypothyroidism         Allergies   Allergen Reactions     Other Food Allergy Other (See Comments)     Almonds, cherries, apples and walnuts. Apples and cherries cause lip swelling.     Seasonal Allergies      Birch trees, oak trees, grass pollen, mugwart     Zyrtec [Cetirizine] Other (See Comments)     HOT BURNING feeling over entire body.     Current Outpatient Medications   Medication     albuterol (PROAIR HFA/PROVENTIL HFA/VENTOLIN HFA) 108 (90 Base) MCG/ACT inhaler     budesonide (PULMICORT) 0.5 MG/2ML neb solution      "clobetasol (TEMOVATE) 0.05 % ointment     desonide (DESOWEN) 0.05 % external cream     dexlansoprazole (DEXILANT) 30 MG CPDR CR capsule     EPINEPHrine (ANY BX GENERIC EQUIV) 0.3 MG/0.3ML injection 2-pack     EPINEPHrine (ANY BX GENERIC EQUIV) 0.3 MG/0.3ML injection 2-pack     fluticasone (FLONASE) 50 MCG/ACT nasal spray     HYDROcodone-acetaminophen (NORCO) 5-325 MG per tablet     levocetirizine (XYZAL) 5 MG tablet     LEVOTHYROXINE SODIUM PO     loratadine (CLARITIN) 10 MG tablet     ORDER FOR ALLERGEN IMMUNOTHERAPY     No current facility-administered medications for this visit.      Review Of Systems- SEE HPI  /72 (Cuff Size: Adult Regular)   Pulse 80   Temp 98.1  F (36.7  C) (Tympanic)   Ht 1.778 m (5' 10\")   Wt 88.5 kg (195 lb)   SpO2 96%   BMI 27.98 kg/m      Head and Face - Normocephalic and atraumatic, with no gross asymmetry noted.  The facial nerve is intact, with strong symmetric movements.  Voice and Breathing - The patient was breathing comfortably without the use of accessory muscles. There was no wheezing, stridor, or stertor.  The patients voice was clear and strong, and had appropriate pitch and quality.  No trevor peripheral digital clubbing or cyanosis   Ears -The external auditory canals are patent, the tympanic membranes are intact without effusion, retraction or mass.  Bony landmarks are intact.  Eyes - Extraocular movements intact, and the pupils were reactive to light.  Sclera were not icteric or injected, conjunctiva were pink and moist.  Mouth - Examination of the oral cavity showed pink, healthy oral mucosa. No lesions or ulcerations noted.  The tongue was mobile and midline, and the dentition were in good condition.    Throat - The walls of the oropharynx were smooth, pink, moist, symmetric, and had no lesions or ulcerations.  The tonsillar pillars and soft palate were symmetric.  The uvula was midline on elevation.  Ball Tongue Position III, grade 1 tonsils   Neck - No " palpable enlarged fixed cervical lymph nodes.  No neck cysts or unusual tenderness to palpation.   No palpable fixed thyroid nodules or concerning goiter.  The trachea is grossly midline.   Nose - External contour is symmetric, no gross deflection or scars.  Nasal mucosa is pink and moist with no abnormal mucus.  The septum and turbinates were evaluated: mild TH, septal button in place.  No polyps, masses, or purulence noted on examination.   Heart- Regular rate  Lungs- Clear A/p    ASSESSMENT:    ICD-10-CM    1. Perennial allergic rhinitis  J30.89    2. Food allergy  Z91.018    3. oral allergy syndrome  T78.1XXA    4. JUVENTINO (obstructive sleep apnea)  G47.33        Continue with allergy injections.   Maintain weekly injections.   Continue with Claritin daily   Consider additional medications if worsening symptoms-   Try Flonase 1 spray to each nostril daily.     Follow pathology report from scope- eosinophils. (? Eosinophilic esophagitis vs. Reflux)    Return in 6 months.     Krupa Fowler PA-C  ENT  [facility] [place]

## 2021-06-18 NOTE — LETTER
6/18/2021         RE: Shane Rai  [address] [facility] [address]        Dear Colleague,    Thank you for referring your patient, Shane Rai, to the [facility]. Please see a copy of my visit note below.    Chief Complaint   Patient presents with     Allergies     Pt is here for a f/u immunotherapy injections.       Patient returns for MQT  Last seen by Dr. Devlin on 3/11/21  He has not started on rinses, antihistamines.   Shane has rinsed before in the past and felt that some would remain present.   He has been using Flonase and had some nasal burning. He has since felt the sensation with some relief. He has tried Nasacort in past.   He was started on BID AH, but was fatigued. He is on Claritin daily. Energy has been better   He has felt like an itch in his lungs. He felt like a tongue/ sensation.        MQT- 3/19/21  Dilution #6- Birch  Dilution #5-Grass, oak, mold, cat, dog, dust  Dilution #2- weeds, maple, elm, cottonwood, molds.      Food Panel completed- Pending.   No history of perennial allergic rhinitis in 2012 he underwent skin testing with Dr. Tomas that showed high sensitivity to tree grass cocklebur mugwort mild dust  He had covid in mid December and still notes hyoposmia and occasional shortness of breath   He notes occasion choking with oral intake     Apples, cherries, almonds all based on prior testing.  No hx of anaphylaxis with food intake  With green apples he had oral swelling of the lips, no anaphylaxis  Juanita cherries caused lip swelling, no other symptoms  No problems with almond intake    Past Medical History:   Diagnosis Date     Gastro-oesophageal reflux disease      Hypothyroidism         Allergies   Allergen Reactions     Other Food Allergy Other (See Comments)     Almonds, cherries, apples and walnuts. Apples and cherries cause lip swelling.     Seasonal Allergies      Birch trees, oak trees, grass pollen, mugwart     Zyrtec [Cetirizine] Other (See  "Comments)     HOT BURNING feeling over entire body.     Current Outpatient Medications   Medication     albuterol (PROAIR HFA/PROVENTIL HFA/VENTOLIN HFA) 108 (90 Base) MCG/ACT inhaler     budesonide (PULMICORT) 0.5 MG/2ML neb solution     clobetasol (TEMOVATE) 0.05 % ointment     desonide (DESOWEN) 0.05 % external cream     dexlansoprazole (DEXILANT) 30 MG CPDR CR capsule     EPINEPHrine (ANY BX GENERIC EQUIV) 0.3 MG/0.3ML injection 2-pack     EPINEPHrine (ANY BX GENERIC EQUIV) 0.3 MG/0.3ML injection 2-pack     fluticasone (FLONASE) 50 MCG/ACT nasal spray     HYDROcodone-acetaminophen (NORCO) 5-325 MG per tablet     levocetirizine (XYZAL) 5 MG tablet     LEVOTHYROXINE SODIUM PO     loratadine (CLARITIN) 10 MG tablet     ORDER FOR ALLERGEN IMMUNOTHERAPY     No current facility-administered medications for this visit.      Review Of Systems- SEE HPI  /72 (Cuff Size: Adult Regular)   Pulse 80   Temp 98.1  F (36.7  C) (Tympanic)   Ht 1.778 m (5' 10\")   Wt 88.5 kg (195 lb)   SpO2 96%   BMI 27.98 kg/m      Head and Face - Normocephalic and atraumatic, with no gross asymmetry noted.  The facial nerve is intact, with strong symmetric movements.  Voice and Breathing - The patient was breathing comfortably without the use of accessory muscles. There was no wheezing, stridor, or stertor.  The patients voice was clear and strong, and had appropriate pitch and quality.  No trevor peripheral digital clubbing or cyanosis   Ears -The external auditory canals are patent, the tympanic membranes are intact without effusion, retraction or mass.  Bony landmarks are intact.  Eyes - Extraocular movements intact, and the pupils were reactive to light.  Sclera were not icteric or injected, conjunctiva were pink and moist.  Mouth - Examination of the oral cavity showed pink, healthy oral mucosa. No lesions or ulcerations noted.  The tongue was mobile and midline, and the dentition were in good condition.    Throat - The walls of " the oropharynx were smooth, pink, moist, symmetric, and had no lesions or ulcerations.  The tonsillar pillars and soft palate were symmetric.  The uvula was midline on elevation.  Ball Tongue Position III, grade 1 tonsils   Neck - No palpable enlarged fixed cervical lymph nodes.  No neck cysts or unusual tenderness to palpation.   No palpable fixed thyroid nodules or concerning goiter.  The trachea is grossly midline.   Nose - External contour is symmetric, no gross deflection or scars.  Nasal mucosa is pink and moist with no abnormal mucus.  The septum and turbinates were evaluated: mild TH, septal button in place.  No polyps, masses, or purulence noted on examination.   Heart- Regular rate  Lungs- Clear A/p    ASSESSMENT:    ICD-10-CM    1. Perennial allergic rhinitis  J30.89    2. Food allergy  Z91.018    3. oral allergy syndrome  T78.1XXA    4. JUVENTINO (obstructive sleep apnea)  G47.33        Continue with allergy injections.   Maintain weekly injections.   Continue with Claritin daily   Consider additional medications if worsening symptoms-   Try Flonase 1 spray to each nostril daily.     Follow pathology report from scope- eosinophils. (? Eosinophilic esophagitis vs. Reflux)    Return in 6 months.     Krupa Fowler PA-C  ENT  [facility] [place]            Again, thank you for allowing me to participate in the care of your patient.        Sincerely,        Krupa Fowler PA-C

## 2021-06-24 ENCOUNTER — ALLIED HEALTH/NURSE VISIT (OUTPATIENT)
Dept: ALLERGY | Facility: OTHER | Age: 51
End: 2021-06-24
Attending: PHYSICIAN ASSISTANT
Payer: COMMERCIAL

## 2021-06-24 DIAGNOSIS — J30.89 PERENNIAL ALLERGIC RHINITIS: Primary | ICD-10-CM

## 2021-06-24 PROCEDURE — 95117 IMMUNOTHERAPY INJECTIONS: CPT

## 2021-06-24 NOTE — PROGRESS NOTES
Prior to injection verified pt identity using pt name and date of birth.    Allergy injection/s given and charted on paper allergy flow sheet.  Patient left AMA, signing form, not staying for the observation period.    Clyde Blackwood RN on 6/24/2021 at 4:05 PM

## 2021-07-02 ENCOUNTER — ALLIED HEALTH/NURSE VISIT (OUTPATIENT)
Dept: ALLERGY | Facility: OTHER | Age: 51
End: 2021-07-02
Attending: PHYSICIAN ASSISTANT
Payer: COMMERCIAL

## 2021-07-02 DIAGNOSIS — J30.89 PERENNIAL ALLERGIC RHINITIS: Primary | ICD-10-CM

## 2021-07-02 PROCEDURE — 95117 IMMUNOTHERAPY INJECTIONS: CPT

## 2021-07-02 NOTE — PROGRESS NOTES
Prior to injection verified pt identity using pt name and date of birth.    Allergy injection/s given and charted on paper allergy flow sheet.  Patient left AMA, signing form, not staying for the observation period.    Clyde Blackwood RN on 7/2/2021 at 4:04 PM

## 2021-07-12 ENCOUNTER — ALLIED HEALTH/NURSE VISIT (OUTPATIENT)
Dept: ALLERGY | Facility: OTHER | Age: 51
End: 2021-07-12
Attending: PHYSICIAN ASSISTANT
Payer: COMMERCIAL

## 2021-07-12 DIAGNOSIS — J30.89 PERENNIAL ALLERGIC RHINITIS: Primary | ICD-10-CM

## 2021-07-12 PROCEDURE — 95117 IMMUNOTHERAPY INJECTIONS: CPT

## 2021-07-12 NOTE — PROGRESS NOTES
Prior to injection verified pt identity using pt name and date of birth.    Allergy injection/s given and charted on paper allergy flow sheet.  Patient left AMA, signing form, not staying for the observation period.    Clyde Blackwood RN on 7/12/2021 at 4:21 PM

## 2021-07-19 ENCOUNTER — ALLIED HEALTH/NURSE VISIT (OUTPATIENT)
Dept: ALLERGY | Facility: OTHER | Age: 51
End: 2021-07-19
Attending: PHYSICIAN ASSISTANT
Payer: COMMERCIAL

## 2021-07-19 DIAGNOSIS — J30.89 PERENNIAL ALLERGIC RHINITIS: Primary | ICD-10-CM

## 2021-07-19 PROCEDURE — 95117 IMMUNOTHERAPY INJECTIONS: CPT

## 2021-07-19 NOTE — PROGRESS NOTES
Prior to injection, verified patient's identity using patient's name and date of birth.    Allergy injection/s given and charted on paper allergy flow sheet.  Patient signed out AMA and did not stay for observation.    Rissa Cash RN

## 2021-07-21 ENCOUNTER — ALLIED HEALTH/NURSE VISIT (OUTPATIENT)
Dept: ALLERGY | Facility: OTHER | Age: 51
End: 2021-07-21
Attending: PHYSICIAN ASSISTANT
Payer: COMMERCIAL

## 2021-07-21 DIAGNOSIS — J30.89 PERENNIAL ALLERGIC RHINITIS: Primary | ICD-10-CM

## 2021-07-21 PROCEDURE — 95165 ANTIGEN THERAPY SERVICES: CPT | Performed by: PHYSICIAN ASSISTANT

## 2021-07-21 NOTE — PROGRESS NOTES
Allergy serum is mixed today at [facility] schedule 2 of 4, into two (5 ml) multi dose vial/vials.    Allergens included were:    Ragweed  0.2 ml of dilution # 1  Pigweed  0.2 ml of dilution # 1  Mugwort 0.2 ml of dilution # 0  Kochia  0.2 ml of dilution # 0  Russian Thistle 0.2 ml of dilution # 1  Romel Grass 0.2 ml of dilution # 2  Birch mix 0.2 ml of dilution # 3  Maple Mix 0.2 of dilution # 1  Elm Mix 0.2 ml of dilution # 1  Oak Mix 0.2 ml of dilution # 2  Chele Mix 0.2 ml of dilution # 0  Pine Mix 0.2 ml of dilution # 0  Eastern [place] 0.2 ml of dilution # 1  Black [place] 0.2 ml of dilution # 0  Aspen 0.2 ml of dilution # 0  Red [place] 0.2 ml of dilution # 0    Alternaria 0.2 ml of dilution # 1  Aspergillus 0.2 ml of dilution # 1  Hormodendrum 0.2 ml of dilution # 1  Helminthosporium 0.2 ml of dilution # 1  Penicillium 0.2 ml of dilution # 1  Epicoccum 0.2 ml of dilution # 1  Fusarium 0.2 ml of dilution # 0  Mucor 0.2 ml of dilution # 0  Grain Smut 0.2 ml of dilution # 0  Grass Smut 0.2 ml of dilution # 0  Cat 0.2 ml of dilution # 1  Dog 0.2 ml of dilution # 1  Feather Mix 0.2 ml of dilution # 0  Dust Mite Mix 0.2 ml of dilution # 1  Horse 0.2 ml of dilution # 0    Rissa Cash RN

## 2021-07-26 NOTE — PROGRESS NOTES
"Shane Rai is a 51 year old male who is being evaluated via a billable video visit.       The patient has been notified of following:      \"This video visit will be conducted via a call between you and your physician/provider. We have found that certain health care needs can be provided without the need for an in-person physical exam.  This service lets us provide the care you need with a video conversation.  If a prescription is necessary we can send it directly to your pharmacy.  If lab work is needed we can place an order for that and you can then stop by our lab to have the test done at a later time.     Video visits are billed at different rates depending on your insurance coverage.  Please reach out to your insurance provider with any questions.     If during the course of the call the physician/provider feels a video visit is not appropriate, you will not be charged for this service.\"     Patient has given verbal consent for Video visit? Yes  How would you like to obtain your AVS? Mail a copy  If you are dropped from the video visit, the video invite should be resent to: Text to cell phone: -  Will anyone else be joining your video visit? No  If patient encounters technical issues they should call [phone]      Video-Visit Details     Type of service:  Video Visit     Video Start Time: 3pm  Video End Time: 3:15pm    Originating Location (pt. Location): Home     Distant Location (provider location):  [facility] SLEEP [facility]      Platform used for Video Visit: Doximity    Virtual visit for CPAP compliance follow-up of moderate JUVENTINO managed with CPAP.     Assessment:  - Moderate obstructive sleep apnea.  - Very strong positional component (supine AHI 78.7 / lateral AHI 3.3 with 22.6% of recording in supine sleep position)  - Sleep associated hypoxemia was present.  - Primary sleep concerns of socially disruptive snoring, daytime fatigue     Plan:  - Appears well controlled with CPAP " auto-titrate 5-15 cm H2O per download.  - Benefits of snoring resolved, mild benefit in fatigue.  - Given struggle with mask fit / comfort with nasal (and possibly nasal pillows), consider trial of FFM interface.    SUBJECTIVE:  Shane Rai is a 51 year old year old male.    Today - Reviewed his experience with CPAP so far.  Has had some struggles with mask fit with nasal and what I believe is nasal pillows, interested in trying FFM.  Snoring has resolved.  Feels there has been a small improvement in fatigue.    CPAP download from 2021 - 2021 on auto-titrate 5-15 cm H2O.  Used 30/30 days.  Average daily usage of 6 hours 22 minutes.  AHI 1.3.    Prior Sleep Testin2013 - [facility].  Unknown weight.  .5 minutes.  AHI 12.9.  Mean SpO2 94%, светлана SpO2 81%.  SpO2 <= 88% for 0.6% of recording.  2021 - HST with weight 195 lbs, BMI 28.  AHI 20.4.  Average oxygen saturation was 93.2%.  Time with saturation less than or equal to 88% was 13.3 minutes. The lowest oxygen saturation was 79%.         Past medical history:    Patient Active Problem List    Diagnosis Date Noted     Herpes zoster complication 2013     Priority: Medium     Backache 2012     Priority: Medium     IMO Update       Hypothyroidism 2012     Priority: Medium     Allergic rhinitis 2011     Priority: Medium     IMO Update       Adjustment disorder with depressed mood 2008     Priority: Medium     More approapriate diagnosis is major depression due to long term hx of depression.       Generalized anxiety disorder 2008     Priority: Medium     Major depressive disorder, recurrent episode, mild (H) 2008     Priority: Medium     IMO Update 10/11       Esophageal reflux 2007     Priority: Medium     Abdominal pain, epigastric 2007     Priority: Medium     Anxiety state 2006     Priority: Medium     IMO Update 10/11         10 point ROS of systems including Constitutional, Eyes,  Respiratory, Cardiovascular, Gastroenterology, Genitourinary, Integumentary, Muscularskeletal, Psychiatric were all negative except for pertinent positives noted in my HPI.    Current Outpatient Medications   Medication Sig Dispense Refill     albuterol (PROAIR HFA/PROVENTIL HFA/VENTOLIN HFA) 108 (90 Base) MCG/ACT inhaler Inhale 2 puffs into the lungs every 6 hours       budesonide (PULMICORT) 0.5 MG/2ML neb solution Squirt entire vial into previously made pat med saline bottle, mix, and irrigate each nostril until entire bottle empty. BID. 60 mL 0     clobetasol (TEMOVATE) 0.05 % ointment Apply  topically 2 times daily.       desonide (DESOWEN) 0.05 % external cream Apply topically 2 times daily       dexlansoprazole (DEXILANT) 30 MG CPDR CR capsule Take 30 mg by mouth daily       EPINEPHrine (ANY BX GENERIC EQUIV) 0.3 MG/0.3ML injection 2-pack Inject 0.3 mLs (0.3 mg) into the muscle as needed for anaphylaxis 0.6 mL 1     EPINEPHrine (ANY BX GENERIC EQUIV) 0.3 MG/0.3ML injection 2-pack Inject 0.3 mg into the muscle as needed for anaphylaxis       fluticasone (FLONASE) 50 MCG/ACT nasal spray Spray 2 sprays into both nostrils daily (Patient not taking: Reported on 6/18/2021) 16 g 12     LEVOTHYROXINE SODIUM PO Take 125 mcg by mouth        ORDER FOR ALLERGEN IMMUNOTHERAPY Patient to start allergy shots.  Follow standard build-up protocols. 10 mL PRN       OBJECTIVE:  There were no vitals taken for this visit.    Physical Exam     ---  This note was written with the assistance of the Dragon voice-dictation technology software. The final document, although reviewed, may contain errors. For corrections, please contact the office.    Calixto Michaels MD    Sleep Medicine  [facility]  ([phone])  [facility]  ([phone])

## 2021-07-27 ENCOUNTER — VIRTUAL VISIT (OUTPATIENT)
Dept: SLEEP MEDICINE | Facility: HOSPITAL | Age: 51
End: 2021-07-27
Attending: FAMILY MEDICINE
Payer: COMMERCIAL

## 2021-07-27 DIAGNOSIS — R53.82 CHRONIC FATIGUE: ICD-10-CM

## 2021-07-27 DIAGNOSIS — F41.1 GENERALIZED ANXIETY DISORDER: ICD-10-CM

## 2021-07-27 DIAGNOSIS — G47.33 OSA (OBSTRUCTIVE SLEEP APNEA): Primary | ICD-10-CM

## 2021-07-27 DIAGNOSIS — F33.0 MAJOR DEPRESSIVE DISORDER, RECURRENT EPISODE, MILD (H): ICD-10-CM

## 2021-07-27 PROCEDURE — 99213 OFFICE O/P EST LOW 20 MIN: CPT | Mod: 95 | Performed by: FAMILY MEDICINE

## 2021-07-27 NOTE — PROGRESS NOTES
"Shane is a 51 year old who is being evaluated via a billable video visit.      How would you like to obtain your AVS? MyChart  If the video visit is dropped, the invitation should be resent by: Text to cell phone: [phone]  Will anyone else be joining your video visit? {:[id number]}  {If patient encounters technical issues they should call [phone] :869001}    Video Start Time: {video visit start/end time for provider to select:774321}    {PROVIDER CHARTING PREFERENCE:738137}    Subjective   Shane is a 51 year old who presents for the following health issues {ACCOMPANIED BY STATEMENT (Optional):670721}    HPI     ***    Review of Systems   {ROS COMP (Optional):686979}      Objective           Vitals:  No vitals were obtained today due to virtual visit.    Physical Exam   {video visit exam brief selected:849538::\"GENERAL: Healthy, alert and no distress\",\"EYES: Eyes grossly normal to inspection.  No discharge or erythema, or obvious scleral/conjunctival abnormalities.\",\"RESP: No audible wheeze, cough, or visible cyanosis.  No visible retractions or increased work of breathing.  \",\"SKIN: Visible skin clear. No significant rash, abnormal pigmentation or lesions.\",\"NEURO: Cranial nerves grossly intact.  Mentation and speech appropriate for age.\",\"PSYCH: Mentation appears normal, affect normal/bright, judgement and insight intact, normal speech and appearance well-groomed.\"}    {Diagnostic Test Results (Optional):431159}    {AMBULATORY ATTESTATION (Optional):538227}        Video-Visit Details    Type of service:  Video Visit    Video End Time:{video visit start/end time for provider to select:652559}    Originating Location (pt. Location): {video visit patient location:975412::\"Home\"}    Distant Location (provider location):  [facility]     Platform used for Video Visit: {Virtual Visit Platforms:904849::\"PunchTabWell\"}    "

## 2021-07-27 NOTE — PROGRESS NOTES
CPAP Compliance Visit:       Name: Shane Rai MRN# [id number]   Age: 51 year old YOB: 1970     Date of Consultation: July 27, 2021  Primary care provider: Sam Pettit    Compliance:   100 % of days with >4 hours of use.   0days/30 with no use   Average use: 6hours 22minutes per day   95%ile leak 23.8 L/min   CPAP 95% pressure 7.8 cm   AHI 1.3 events/hour   NAKUL 0.7  PB 0%     Impression:   Patient is {CPAP Compliance :297556}

## 2021-08-05 ENCOUNTER — ALLIED HEALTH/NURSE VISIT (OUTPATIENT)
Dept: ALLERGY | Facility: OTHER | Age: 51
End: 2021-08-05
Attending: PHYSICIAN ASSISTANT
Payer: COMMERCIAL

## 2021-08-05 DIAGNOSIS — J30.89 PERENNIAL ALLERGIC RHINITIS: Primary | ICD-10-CM

## 2021-08-05 PROCEDURE — 95117 IMMUNOTHERAPY INJECTIONS: CPT

## 2021-08-05 NOTE — PROGRESS NOTES
Prior to injection patient identity verified using name and date of birth.    SVT done on right arm, measuring 7 mm.  Passed  SVT done on left arm, measuring 9 mm.  Passed    Documented on paper flowsheet.     Allergy injection/s given and charted on paper allergy flow sheet.  Patient left AMA, signing form, not staying for the observation period.    Clyde Blackwood RN on 8/5/2021 at 4:20 PM

## 2021-08-07 ENCOUNTER — HEALTH MAINTENANCE LETTER (OUTPATIENT)
Age: 51
End: 2021-08-07

## 2021-08-16 ENCOUNTER — ALLIED HEALTH/NURSE VISIT (OUTPATIENT)
Dept: ALLERGY | Facility: OTHER | Age: 51
End: 2021-08-16
Attending: PHYSICIAN ASSISTANT
Payer: COMMERCIAL

## 2021-08-16 DIAGNOSIS — J30.89 PERENNIAL ALLERGIC RHINITIS: Primary | ICD-10-CM

## 2021-08-16 PROCEDURE — 95117 IMMUNOTHERAPY INJECTIONS: CPT

## 2021-08-16 NOTE — PROGRESS NOTES
Prior to injection verified pt identity using pt name and date of birth.    Allergy injection/s given and charted on paper allergy flow sheet.  Patient left AMA, signing form, not staying for the observation period.    Clyde Blackwood RN on 8/16/2021 at 4:12 PM

## 2021-08-24 ENCOUNTER — ALLIED HEALTH/NURSE VISIT (OUTPATIENT)
Dept: ALLERGY | Facility: OTHER | Age: 51
End: 2021-08-24
Attending: PHYSICIAN ASSISTANT
Payer: COMMERCIAL

## 2021-08-24 DIAGNOSIS — J30.89 PERENNIAL ALLERGIC RHINITIS: Primary | ICD-10-CM

## 2021-08-24 PROCEDURE — 95117 IMMUNOTHERAPY INJECTIONS: CPT

## 2021-09-10 ENCOUNTER — ALLIED HEALTH/NURSE VISIT (OUTPATIENT)
Dept: ALLERGY | Facility: OTHER | Age: 51
End: 2021-09-10
Attending: PHYSICIAN ASSISTANT
Payer: COMMERCIAL

## 2021-09-10 DIAGNOSIS — J30.89 PERENNIAL ALLERGIC RHINITIS: Primary | ICD-10-CM

## 2021-09-10 DIAGNOSIS — J30.9 ALLERGIC RHINITIS: ICD-10-CM

## 2021-09-10 PROCEDURE — 95117 IMMUNOTHERAPY INJECTIONS: CPT

## 2021-09-16 DIAGNOSIS — G47.33 OSA (OBSTRUCTIVE SLEEP APNEA): Primary | ICD-10-CM

## 2021-09-17 ENCOUNTER — ALLIED HEALTH/NURSE VISIT (OUTPATIENT)
Dept: ALLERGY | Facility: OTHER | Age: 51
End: 2021-09-17
Attending: PHYSICIAN ASSISTANT
Payer: COMMERCIAL

## 2021-09-17 DIAGNOSIS — J30.89 PERENNIAL ALLERGIC RHINITIS: Primary | ICD-10-CM

## 2021-09-17 PROCEDURE — 95117 IMMUNOTHERAPY INJECTIONS: CPT

## 2021-09-24 ENCOUNTER — ALLIED HEALTH/NURSE VISIT (OUTPATIENT)
Dept: ALLERGY | Facility: OTHER | Age: 51
End: 2021-09-24
Attending: PHYSICIAN ASSISTANT
Payer: COMMERCIAL

## 2021-09-24 DIAGNOSIS — J30.89 PERENNIAL ALLERGIC RHINITIS: Primary | ICD-10-CM

## 2021-09-24 PROCEDURE — 95117 IMMUNOTHERAPY INJECTIONS: CPT

## 2021-09-24 NOTE — PROGRESS NOTES
Prior to injection verified pt identity using pt name and date of birth.    Allergy injection/s given and charted on paper allergy flow sheet.  Patient left AMA, signing form, not staying for the observation period.    Clyde Blackwood RN on 9/24/2021 at 4:07 PM

## 2021-10-01 ENCOUNTER — TELEPHONE (OUTPATIENT)
Dept: ALLERGY | Facility: OTHER | Age: 51
End: 2021-10-01

## 2021-10-01 ENCOUNTER — ALLIED HEALTH/NURSE VISIT (OUTPATIENT)
Dept: ALLERGY | Facility: OTHER | Age: 51
End: 2021-10-01
Attending: PHYSICIAN ASSISTANT
Payer: COMMERCIAL

## 2021-10-01 DIAGNOSIS — J30.89 PERENNIAL ALLERGIC RHINITIS: Primary | ICD-10-CM

## 2021-10-01 DIAGNOSIS — J30.89 PERENNIAL ALLERGIC RHINITIS: ICD-10-CM

## 2021-10-01 PROCEDURE — 95117 IMMUNOTHERAPY INJECTIONS: CPT

## 2021-10-01 NOTE — TELEPHONE ENCOUNTER
Patient was in today for an allergy injection.  He requested a refill on budesonide, which is pended for you to sign.  He also had questions about Flonase.  He states he quit using it because it dried out his nose too much.  He denied nose bleeds, but noted dry nose.  He is wondering if there is an alternative nasal spray he could try?  Thank you.    Rissa Cash RN

## 2021-10-02 ENCOUNTER — HEALTH MAINTENANCE LETTER (OUTPATIENT)
Age: 51
End: 2021-10-02

## 2021-10-05 RX ORDER — MOMETASONE FUROATE MONOHYDRATE 50 UG/1
2 SPRAY, METERED NASAL DAILY
Qty: 17 G | Refills: 4 | Status: SHIPPED | OUTPATIENT
Start: 2021-10-05 | End: 2021-10-28

## 2021-10-05 RX ORDER — BUDESONIDE 0.5 MG/2ML
INHALANT ORAL
Qty: 60 ML | Refills: 11 | Status: SHIPPED | OUTPATIENT
Start: 2021-10-05

## 2021-10-05 NOTE — TELEPHONE ENCOUNTER
Attempted to reach patient with the information from [facility].  No answer.  Left message for patient.    Rissa Cash RN

## 2021-10-08 ENCOUNTER — ALLIED HEALTH/NURSE VISIT (OUTPATIENT)
Dept: ALLERGY | Facility: OTHER | Age: 51
End: 2021-10-08
Attending: PHYSICIAN ASSISTANT
Payer: COMMERCIAL

## 2021-10-08 DIAGNOSIS — J30.89 PERENNIAL ALLERGIC RHINITIS: Primary | ICD-10-CM

## 2021-10-08 PROCEDURE — 95117 IMMUNOTHERAPY INJECTIONS: CPT

## 2021-10-22 ENCOUNTER — ALLIED HEALTH/NURSE VISIT (OUTPATIENT)
Dept: ALLERGY | Facility: OTHER | Age: 51
End: 2021-10-22
Attending: PHYSICIAN ASSISTANT
Payer: COMMERCIAL

## 2021-10-22 DIAGNOSIS — J30.89 PERENNIAL ALLERGIC RHINITIS: Primary | ICD-10-CM

## 2021-10-22 PROCEDURE — 95117 IMMUNOTHERAPY INJECTIONS: CPT

## 2021-10-28 DIAGNOSIS — J30.89 PERENNIAL ALLERGIC RHINITIS: ICD-10-CM

## 2021-10-28 RX ORDER — MOMETASONE FUROATE MONOHYDRATE 50 UG/1
2 SPRAY, METERED NASAL DAILY
Qty: 16 G | Refills: 11 | Status: SHIPPED | OUTPATIENT
Start: 2021-10-28 | End: 2022-05-06

## 2021-10-28 NOTE — TELEPHONE ENCOUNTER
Nasonex  Last Written Prescription Date: 10/5/21  Last Fill Quantity: 17 g # of Refills: 4  Last Office Visit: 6/18/21

## 2021-10-29 ENCOUNTER — ALLIED HEALTH/NURSE VISIT (OUTPATIENT)
Dept: ALLERGY | Facility: OTHER | Age: 51
End: 2021-10-29
Attending: PHYSICIAN ASSISTANT
Payer: COMMERCIAL

## 2021-10-29 DIAGNOSIS — J30.89 PERENNIAL ALLERGIC RHINITIS: Primary | ICD-10-CM

## 2021-10-29 PROCEDURE — 95117 IMMUNOTHERAPY INJECTIONS: CPT

## 2021-11-05 ENCOUNTER — ALLIED HEALTH/NURSE VISIT (OUTPATIENT)
Dept: ALLERGY | Facility: OTHER | Age: 51
End: 2021-11-05
Attending: PHYSICIAN ASSISTANT
Payer: COMMERCIAL

## 2021-11-05 DIAGNOSIS — J30.89 PERENNIAL ALLERGIC RHINITIS: Primary | ICD-10-CM

## 2021-11-05 PROCEDURE — 95117 IMMUNOTHERAPY INJECTIONS: CPT

## 2021-11-12 ENCOUNTER — OFFICE VISIT (OUTPATIENT)
Dept: ALLERGY | Facility: OTHER | Age: 51
End: 2021-11-12
Attending: PHYSICIAN ASSISTANT
Payer: COMMERCIAL

## 2021-11-12 DIAGNOSIS — J30.89 PERENNIAL ALLERGIC RHINITIS: Primary | ICD-10-CM

## 2021-11-12 PROCEDURE — 95117 IMMUNOTHERAPY INJECTIONS: CPT

## 2021-11-12 NOTE — PROGRESS NOTES
Prior to injection verified pt identity using pt name and date of birth.    Allergy injection/s given and charted on paper allergy flow sheet.  Patient left AMA, signing form, not staying for the observation period.    Clyde Blackwood RN on 11/12/2021 at 3:04 PM

## 2021-11-17 ENCOUNTER — ALLIED HEALTH/NURSE VISIT (OUTPATIENT)
Dept: ALLERGY | Facility: OTHER | Age: 51
End: 2021-11-17
Attending: PHYSICIAN ASSISTANT
Payer: COMMERCIAL

## 2021-11-17 DIAGNOSIS — J30.89 PERENNIAL ALLERGIC RHINITIS: Primary | ICD-10-CM

## 2021-11-17 PROCEDURE — 95165 ANTIGEN THERAPY SERVICES: CPT | Performed by: PHYSICIAN ASSISTANT

## 2021-11-17 NOTE — PROGRESS NOTES
Allergy serum is mixed today at [facility] schedule 3 of 4,  into  2  (5 ml)  multi dose vial/vials.    Allergens included were:    Ragweed  0.2 ml of dilution # 1  Pigweed  0.2 ml of dilution # 1  Mugwort 0.2 ml of dilution # 0  Kochia  0.2 ml of dilution # 0  Russian Thistle 0.2 ml of dilution # 1  Romel Grass 0.2 ml of dilution # 1  Birch mix 0.2 ml of dilution # 2  Maple Mix 0.2 of dilution # 1  Elm Mix 0.2 ml of dilution # 1  Oak Mix 0.2 ml of dilution # 1  Chele Mix 0.2 ml of dilution # 0  Pine Mix 0.2 ml of dilution # 0  Eastern [place] 0.2 ml of dilution # 1  Black [place] 0.2 ml of dilution # 0  Aspen 0.2 ml of dilution # 0  Red [place] 0.2 ml of dilution # 0    Alternaria 0.2 ml of dilution # 1  Aspergillus 0.2 ml of dilution # 1  Hormodendrum 0.2 ml of dilution # 1  Helminthosporium 0.2 ml of dilution # 1  Penicillium 0.2 ml of dilution # 1  Epicoccum 0.2 ml of dilution # 1  Fusarium 0.2 ml of dilution # 0  Mucor 0.2 ml of dilution # 0  Grain Smut 0.2 ml of dilution # 0  Grass Smut 0.2 ml of dilution # 0  Cat 0.2 ml of dilution # 1  Dog 0.2 ml of dilution # 1  Feather Mix 0.2 ml of dilution # 0  Dust Mite Mix 0.2 ml of dilution # 1  Horse 0.2 ml of dilution # 0    Clyde Blackwood RN on 11/17/2021 at 1:31 PM

## 2021-11-19 ENCOUNTER — ALLIED HEALTH/NURSE VISIT (OUTPATIENT)
Dept: ALLERGY | Facility: OTHER | Age: 51
End: 2021-11-19
Attending: PHYSICIAN ASSISTANT
Payer: COMMERCIAL

## 2021-11-19 DIAGNOSIS — J30.89 PERENNIAL ALLERGIC RHINITIS: Primary | ICD-10-CM

## 2021-11-19 PROCEDURE — 95117 IMMUNOTHERAPY INJECTIONS: CPT

## 2021-11-19 NOTE — PROGRESS NOTES
Prior to injection patient identity verified using name and date of birth.    SVT done on right arm, measuring 7 mm.  Passed.  SVT done on left arm, measuring 9 mm.  Passed    Documented on paper flowsheet.     Allergy injection/s given and charted on paper allergy flow sheet.  Patient left AMA, signing form, not staying for the observation period.    Clyde Blackwood RN on 11/19/2021 at 4:19 PM

## 2021-12-07 ENCOUNTER — ALLIED HEALTH/NURSE VISIT (OUTPATIENT)
Dept: ALLERGY | Facility: OTHER | Age: 51
End: 2021-12-07
Attending: PHYSICIAN ASSISTANT
Payer: COMMERCIAL

## 2021-12-07 DIAGNOSIS — J30.89 PERENNIAL ALLERGIC RHINITIS: Primary | ICD-10-CM

## 2021-12-07 PROCEDURE — 95117 IMMUNOTHERAPY INJECTIONS: CPT

## 2021-12-07 NOTE — PROGRESS NOTES
Prior to injection verified pt identity using pt name and date of birth.    Allergy injection/s given and charted on paper allergy flow sheet.  Patient left AMA, signing form, not staying for the observation period.    Clyde Blackwood RN on 12/7/2021 at 4:18 PM

## 2021-12-14 ENCOUNTER — ALLIED HEALTH/NURSE VISIT (OUTPATIENT)
Dept: ALLERGY | Facility: OTHER | Age: 51
End: 2021-12-14
Attending: PHYSICIAN ASSISTANT
Payer: COMMERCIAL

## 2021-12-14 DIAGNOSIS — J30.89 PERENNIAL ALLERGIC RHINITIS: Primary | ICD-10-CM

## 2021-12-14 PROCEDURE — 95117 IMMUNOTHERAPY INJECTIONS: CPT

## 2021-12-14 NOTE — PROGRESS NOTES
Prior to injection verified pt identity using pt name and date of birth.    Allergy injection/s given and charted on paper allergy flow sheet.  Patient left AMA, signing form, not staying for the observation period.    Clyde Blackwood RN on 12/14/2021 at 4:28 PM

## 2021-12-21 ENCOUNTER — ALLIED HEALTH/NURSE VISIT (OUTPATIENT)
Dept: ALLERGY | Facility: OTHER | Age: 51
End: 2021-12-21
Attending: PHYSICIAN ASSISTANT
Payer: COMMERCIAL

## 2021-12-21 DIAGNOSIS — J30.89 PERENNIAL ALLERGIC RHINITIS: Primary | ICD-10-CM

## 2021-12-21 PROCEDURE — 95117 IMMUNOTHERAPY INJECTIONS: CPT

## 2021-12-21 NOTE — PROGRESS NOTES
Prior to injection verified pt identity using pt name and date of birth.    Allergy injection/s given and charted on paper allergy flow sheet.  Patient left AMA, signing form, not staying for the observation period.    Clyde Blackwood RN on 12/21/2021 at 4:22 PM

## 2021-12-28 ENCOUNTER — ALLIED HEALTH/NURSE VISIT (OUTPATIENT)
Dept: ALLERGY | Facility: OTHER | Age: 51
End: 2021-12-28
Attending: PHYSICIAN ASSISTANT
Payer: COMMERCIAL

## 2021-12-28 DIAGNOSIS — J30.89 PERENNIAL ALLERGIC RHINITIS: Primary | ICD-10-CM

## 2021-12-28 PROCEDURE — 95117 IMMUNOTHERAPY INJECTIONS: CPT

## 2021-12-28 NOTE — PROGRESS NOTES
Prior to injection verified pt identity using pt name and date of birth.      Patient had a couple food reactions over the [place] holiday.  He ate a a piece of carrot in his salad at [employer].  He said it felt like his throat was dry and something was stuck in it.  The second episode he was eating a cookie with almond extract, he again just felt dry in this throat.  He took an extra Claritin and it did seem to help with the carrot episode, but not the almond one.  He denied any anaphylaxis or needing to use his Epi-pen, and this was 2 days after his last allergy injection.  A copy of the cross reactivity was given to the patient, and we discussed the elimination diet.  Did discuss with the Kelsie Cortes CNP, and she didn't think that these food reactions were caused from his allergy injections at this time.  She said it was ok to advance the dose per protocol.  Advised patient to keep track of any foods that cause any similar reactions, and to always keep his epi-pen close.  Explained that an allergy can develop at anytime, no matter if it is was his first or tenth time eating a specific food or med.   Patient verbalized understanding.    Allergy injection/s given and charted on paper allergy flow sheet.  Patient left AMA, signing form, not staying for the observation period.    Clyde Blackwood RN on 12/28/2021 at 4:46 PM

## 2022-01-07 ENCOUNTER — ALLIED HEALTH/NURSE VISIT (OUTPATIENT)
Dept: ALLERGY | Facility: OTHER | Age: 52
End: 2022-01-07
Attending: PHYSICIAN ASSISTANT
Payer: COMMERCIAL

## 2022-01-07 DIAGNOSIS — J30.89 PERENNIAL ALLERGIC RHINITIS: Primary | ICD-10-CM

## 2022-01-07 PROCEDURE — 95117 IMMUNOTHERAPY INJECTIONS: CPT

## 2022-01-07 NOTE — PROGRESS NOTES
Prior to injection verified pt identity using pt name and date of birth.    Allergy injection/s given and charted on paper allergy flow sheet.  Patient left AMA, signing form, not staying for the observation period.    Clyde Blackwood RN on 1/7/2022 at 4:22 PM

## 2022-01-10 ENCOUNTER — DOCUMENTATION ONLY (OUTPATIENT)
Dept: HOME HEALTH SERVICES | Facility: CLINIC | Age: 52
End: 2022-01-10

## 2022-01-10 DIAGNOSIS — G47.33 OSA (OBSTRUCTIVE SLEEP APNEA): Primary | ICD-10-CM

## 2022-01-10 NOTE — PROGRESS NOTES
Patient came to [facility] for mask fitting appointment on January 07, 2022. Patient requested to switch masks because oral breathing. Discussed the following masks: Airfit F20 Patient selected a Resmed Mask name: Airfit F20 Full Face mask size Medium

## 2022-01-31 ENCOUNTER — ALLIED HEALTH/NURSE VISIT (OUTPATIENT)
Dept: ALLERGY | Facility: OTHER | Age: 52
End: 2022-01-31
Attending: PHYSICIAN ASSISTANT
Payer: COMMERCIAL

## 2022-01-31 DIAGNOSIS — J30.89 PERENNIAL ALLERGIC RHINITIS: Primary | ICD-10-CM

## 2022-01-31 PROCEDURE — 95117 IMMUNOTHERAPY INJECTIONS: CPT

## 2022-01-31 NOTE — PROGRESS NOTES
Prior to injection verified pt identity using pt name and date of birth.    Allergy injection/s given and charted on paper allergy flow sheet.  Patient left AMA, signing form, not staying for the observation period.    Clyde Blackwood RN on 1/31/2022 at 4:00 PM

## 2022-02-07 ENCOUNTER — ALLIED HEALTH/NURSE VISIT (OUTPATIENT)
Dept: ALLERGY | Facility: OTHER | Age: 52
End: 2022-02-07
Attending: PHYSICIAN ASSISTANT
Payer: COMMERCIAL

## 2022-02-07 DIAGNOSIS — J30.89 PERENNIAL ALLERGIC RHINITIS: Primary | ICD-10-CM

## 2022-02-07 PROCEDURE — 95117 IMMUNOTHERAPY INJECTIONS: CPT

## 2022-02-07 NOTE — PROGRESS NOTES
Prior to injection verified pt identity using pt name and date of birth.    Allergy injection/s given and charted on paper allergy flow sheet.  Patient left AMA, signing form, not staying for the observation period.    Clyde Blackwood RN on 2/7/2022 at 4:22 PM

## 2022-02-14 ENCOUNTER — ALLIED HEALTH/NURSE VISIT (OUTPATIENT)
Dept: ALLERGY | Facility: OTHER | Age: 52
End: 2022-02-14
Attending: PHYSICIAN ASSISTANT
Payer: COMMERCIAL

## 2022-02-14 DIAGNOSIS — J30.89 PERENNIAL ALLERGIC RHINITIS: Primary | ICD-10-CM

## 2022-02-14 PROCEDURE — 95117 IMMUNOTHERAPY INJECTIONS: CPT

## 2022-02-14 NOTE — PROGRESS NOTES
Prior to injection verified pt identity using pt name and date of birth.    Allergy injection/s given and charted on paper allergy flow sheet.  Patient left AMA, signing form, not staying for the observation period.    Clyde Blackwood RN on 2/14/2022 at 4:21 PM

## 2022-02-23 ENCOUNTER — TELEPHONE (OUTPATIENT)
Dept: OTOLARYNGOLOGY | Facility: OTHER | Age: 52
End: 2022-02-23
Payer: COMMERCIAL

## 2022-02-24 NOTE — TELEPHONE ENCOUNTER
[facility]    Clyde Blackwood RN on 2/24/2022 at 3:08 PM

## 2022-02-28 ENCOUNTER — ALLIED HEALTH/NURSE VISIT (OUTPATIENT)
Dept: ALLERGY | Facility: OTHER | Age: 52
End: 2022-02-28
Attending: PHYSICIAN ASSISTANT
Payer: COMMERCIAL

## 2022-02-28 DIAGNOSIS — J30.89 PERENNIAL ALLERGIC RHINITIS: Primary | ICD-10-CM

## 2022-02-28 PROCEDURE — 95117 IMMUNOTHERAPY INJECTIONS: CPT

## 2022-02-28 NOTE — PROGRESS NOTES
Prior to injection verified pt identity using pt name and date of birth.    Allergy injection/s given and charted on paper allergy flow sheet.  Patient left AMA, signing form, not staying for the observation period.    Clyde Blackwood RN on 2/28/2022 at 4:24 PM

## 2022-03-14 ENCOUNTER — ALLIED HEALTH/NURSE VISIT (OUTPATIENT)
Dept: ALLERGY | Facility: OTHER | Age: 52
End: 2022-03-14
Attending: PHYSICIAN ASSISTANT
Payer: COMMERCIAL

## 2022-03-14 DIAGNOSIS — J30.89 PERENNIAL ALLERGIC RHINITIS: Primary | ICD-10-CM

## 2022-03-14 PROCEDURE — 95117 IMMUNOTHERAPY INJECTIONS: CPT

## 2022-03-14 NOTE — PROGRESS NOTES
Prior to injection verified pt identity using pt name and date of birth.    Allergy injection/s given and charted on paper allergy flow sheet.  Patient left AMA, signing form, not staying for the observation period.    Clyde Blackwood RN on 3/14/2022 at 4:18 PM

## 2022-03-25 ENCOUNTER — ALLIED HEALTH/NURSE VISIT (OUTPATIENT)
Dept: ALLERGY | Facility: OTHER | Age: 52
End: 2022-03-25
Attending: PHYSICIAN ASSISTANT
Payer: COMMERCIAL

## 2022-03-25 ENCOUNTER — TELEPHONE (OUTPATIENT)
Dept: ALLERGY | Facility: OTHER | Age: 52
End: 2022-03-25

## 2022-03-25 DIAGNOSIS — J30.89 PERENNIAL ALLERGIC RHINITIS: Primary | ICD-10-CM

## 2022-03-25 PROCEDURE — 95117 IMMUNOTHERAPY INJECTIONS: CPT

## 2022-03-25 NOTE — PROGRESS NOTES
Prior to injection verified pt identity using pt name and date of birth.    Allergy injection/s given and charted on paper allergy flow sheet.  Patient left AMA, signing form, not staying for the observation period.    Clyde Blackwood RN on 3/25/2022 at 11:23 AM

## 2022-03-25 NOTE — TELEPHONE ENCOUNTER
Patient needs an updated allergy shot (SCIT) order.  New order pended for review and signature. Please, review and sign, thank you!    Clyde Blackwood RN on 3/25/2022 at 11:26 AM

## 2022-04-01 ENCOUNTER — ALLIED HEALTH/NURSE VISIT (OUTPATIENT)
Dept: ALLERGY | Facility: OTHER | Age: 52
End: 2022-04-01
Attending: PHYSICIAN ASSISTANT
Payer: COMMERCIAL

## 2022-04-01 DIAGNOSIS — J30.89 PERENNIAL ALLERGIC RHINITIS: Primary | ICD-10-CM

## 2022-04-01 PROCEDURE — 95117 IMMUNOTHERAPY INJECTIONS: CPT

## 2022-04-01 NOTE — PROGRESS NOTES
Prior to injection verified pt identity using pt name and date of birth.    Allergy injection/s given and charted on paper allergy flow sheet.  Patient left AMA, signing form, not staying for the observation period.    Clyde Blackwood RN on 4/1/2022 at 10:59 AM

## 2022-04-08 ENCOUNTER — ALLIED HEALTH/NURSE VISIT (OUTPATIENT)
Dept: ALLERGY | Facility: OTHER | Age: 52
End: 2022-04-08
Attending: PHYSICIAN ASSISTANT
Payer: COMMERCIAL

## 2022-04-08 DIAGNOSIS — J30.89 PERENNIAL ALLERGIC RHINITIS: Primary | ICD-10-CM

## 2022-04-08 PROCEDURE — 95117 IMMUNOTHERAPY INJECTIONS: CPT

## 2022-04-08 NOTE — PROGRESS NOTES
Prior to injection verified pt identity using pt name and date of birth.    Allergy injection/s given and charted on paper allergy flow sheet.  Patient left AMA, signing form, not staying for the observation period.    Clyde Blackwood RN on 4/8/2022 at 8:18 AM

## 2022-04-15 ENCOUNTER — ALLIED HEALTH/NURSE VISIT (OUTPATIENT)
Dept: ALLERGY | Facility: OTHER | Age: 52
End: 2022-04-15
Attending: PHYSICIAN ASSISTANT
Payer: COMMERCIAL

## 2022-04-15 DIAGNOSIS — J30.89 PERENNIAL ALLERGIC RHINITIS: Primary | ICD-10-CM

## 2022-04-15 PROCEDURE — 95117 IMMUNOTHERAPY INJECTIONS: CPT

## 2022-04-15 NOTE — PROGRESS NOTES
Prior to injection verified pt identity using pt name and date of birth.    Allergy injection/s given and charted on paper allergy flow sheet.  Patient left AMA, signing form, not staying for the observation period.    Clyde Blackwood RN on 4/15/2022 at 10:58 AM

## 2022-04-18 DIAGNOSIS — G47.33 OBSTRUCTIVE SLEEP APNEA (ADULT) (PEDIATRIC): Primary | ICD-10-CM

## 2022-04-20 ENCOUNTER — ALLIED HEALTH/NURSE VISIT (OUTPATIENT)
Dept: ALLERGY | Facility: OTHER | Age: 52
End: 2022-04-20
Attending: PHYSICIAN ASSISTANT
Payer: COMMERCIAL

## 2022-04-20 DIAGNOSIS — J30.89 PERENNIAL ALLERGIC RHINITIS: Primary | ICD-10-CM

## 2022-04-20 PROCEDURE — 95165 ANTIGEN THERAPY SERVICES: CPT | Performed by: PHYSICIAN ASSISTANT

## 2022-04-20 NOTE — PROGRESS NOTES
Allergy serum is mixed today at [facility] schedule 4 of 4,  into  2  (5 ml)  multi dose vial/vials.    Allergens included were:    Ragweed  0.2 ml of dilution # 1  Pigweed  0.2 ml of dilution # 1  Mugwort 0.2 ml of dilution # 0  Kochia  0.2 ml of dilution # 0  Russian Thistle 0.2 ml of dilution # 1  Romel Grass 0.2 ml of dilution # 1  Birch mix 0.2 ml of dilution # 1  Maple Mix 0.2 of dilution # 1  Elm Mix 0.2 ml of dilution # 1  Oak Mix 0.2 ml of dilution # 1  Chele Mix 0.2 ml of dilution # 0  Pine Mix 0.2 ml of dilution # 0  E[place] 0.2 ml of dilution # 1  Black [place] 0.2 ml of dilution # 0  Aspen 0.2 ml of dilution # 0  Red [place] 0.2 ml of dilution # 0    Alternaria 0.2 ml of dilution # 1  Aspergillus 0.2 ml of dilution # 1  Hormodendrum 0.2 ml of dilution # 1  Helminthosporium 0.2 ml of dilution # 1  Penicillium 0.2 ml of dilution # 1  Epicoccum 0.2 ml of dilution # 1  Fusarium 0.2 ml of dilution # 0  Mucor 0.2 ml of dilution # 0  Grain Smut 0.2 ml of dilution # 0  Grass Smut 0.2 ml of dilution # 0  Cat 0.2 ml of dilution # 1  Dog 0.2 ml of dilution # 1  Feather Mix 0.2 ml of dilution # 0  Dust Mite Mix 0.2 ml of dilution # 1  Horse 0.2 ml of dilution # 0    Clyde Blackwood RN on 4/20/2022 at 11:16 AM

## 2022-05-04 ENCOUNTER — ALLIED HEALTH/NURSE VISIT (OUTPATIENT)
Dept: ALLERGY | Facility: OTHER | Age: 52
End: 2022-05-04
Attending: PHYSICIAN ASSISTANT
Payer: COMMERCIAL

## 2022-05-04 DIAGNOSIS — J30.89 PERENNIAL ALLERGIC RHINITIS: Primary | ICD-10-CM

## 2022-05-04 PROCEDURE — 95117 IMMUNOTHERAPY INJECTIONS: CPT

## 2022-05-04 NOTE — PROGRESS NOTES
Prior to injection patient identity verified using name and date of birth.    SVT done on left arm, measuring 11 mm.  Passed  SVT done on right arm, measuring 9 mm.  Passed    Documented on paper flowsheet.     Allergy injection/s given and charted on paper allergy flow sheet.  Patient left AMA, signing form, not staying for the observation period.    Clyde Blackwood RN on 5/4/2022 at 4:31 PM

## 2022-05-05 NOTE — PROGRESS NOTES
Chief Complaint   Patient presents with     Allergies     Scit follow up.     Patient present to ENT for follow-up of allergy immunotherapy.  Alexis was last seen in ENT on 6/18/2021 and was doing fairly well.  He was recommended to continue with Claritin on a daily basis to maintain weekly allergy injections.  He was instructed he may consider Flonase and then 1 spray daily or as needed.    Today, Shane has been doing well with his allergies and injections. He has felt worsening nasal obstruction.   He reports if he is doing physical activity- he feels voice hoarseness/ throat changes and resolved with sitting after about 20-30 minutes. He has used inhaler but hard to tell if it resolves any symptoms.   He feels like his breathing is restricted and can not get enough air.   He has been taking Claritin daily.   No recent use of Nasal spray.     Shane has rinsed before in the past and felt that some would remain present.   He has been using Flonase and had some nasal burning. He has since felt the sensation with some relief. He has tried Nasacort in past.   He was started on BID AH, but was fatigued. He is on Claritin daily. Energy has been better   He has felt like an itch in his lungs. He felt like a tongue/ sensation.   [employer] food panel was overall negative he did have low-grade positives to egg white and milk.     MQT- 3/19/21  Dilution #6- Birch  Dilution #5-Grass, oak, mold, cat, dog, dust  Dilution #2- weeds, maple, elm, cottonwood, molds.      Food Panel completed- Pending.   No history of perennial allergic rhinitis in 2012 he underwent skin testing with Dr. Tomas that showed high sensitivity to tree grass cocklebur mugwort mild dust  He had covid in mid December and still notes hyoposmia and occasional shortness of breath   He notes occasion choking with oral intake     Apples, cherries, almonds all based on prior testing.  No hx of anaphylaxis with food intake  With green apples he had oral swelling of  "the lips, no anaphylaxis  Juanita cherries caused lip swelling, no other symptoms  No problems with almond intake         He does have reflux.   Currently on Dexilant.   Reports worsens with certain food.   Denies dysphagia or dysphonia.   + globus sensation.   +Throat clearing.     Water- few bottles.   Caffeine- 1 cup coffee  ETOH- Rare  Tobacco- None.   Reflux- Yes     Past Medical History:   Diagnosis Date     Gastro-oesophageal reflux disease      Hypothyroidism         Allergies   Allergen Reactions     Other Food Allergy Other (See Comments)     Almonds, cherries, apples and walnuts. Apples and cherries cause lip swelling.     Seasonal Allergies      Birch trees, oak trees, grass pollen, mugwart     Zyrtec [Cetirizine] Other (See Comments)     HOT BURNING feeling over entire body.     Current Outpatient Medications   Medication     albuterol (PROAIR HFA/PROVENTIL HFA/VENTOLIN HFA) 108 (90 Base) MCG/ACT inhaler     budesonide (PULMICORT) 0.5 MG/2ML neb solution     clobetasol (TEMOVATE) 0.05 % ointment     desonide (DESOWEN) 0.05 % external cream     dexlansoprazole (DEXILANT) 30 MG CPDR CR capsule     EPINEPHrine (ANY BX GENERIC EQUIV) 0.3 MG/0.3ML injection 2-pack     EPINEPHrine (ANY BX GENERIC EQUIV) 0.3 MG/0.3ML injection 2-pack     fluticasone (FLONASE) 50 MCG/ACT nasal spray     LEVOTHYROXINE SODIUM PO     mometasone (NASONEX) 50 MCG/ACT nasal spray     ORDER FOR ALLERGEN IMMUNOTHERAPY     No current facility-administered medications for this visit.     >ROS- SEE HPI  /76 (BP Location: Right arm, Cuff Size: Adult Regular)   Pulse 81   Temp 97.3  F (36.3  C) (Tympanic)   Ht 1.753 m (5' 9\")   Wt 90.7 kg (200 lb)   SpO2 97%   BMI 29.53 kg/m      Head and Face - Normocephalic and atraumatic, with no gross asymmetry noted.  The facial nerve is intact, with strong symmetric movements.  Voice and Breathing - The patient was breathing comfortably without the use of accessory muscles. There was no " wheezing, stridor, or stertor.  The patients voice was clear and strong, and had appropriate pitch and quality.  No trevor peripheral digital clubbing or cyanosis   Ears -The external auditory canals are patent, the tympanic membranes are intact without effusion, retraction or mass.  Bony landmarks are intact.  Eyes - Extraocular movements intact, and the pupils were reactive to light.  Sclera were not icteric or injected, conjunctiva were pink and moist.  Mouth - Examination of the oral cavity showed pink, healthy oral mucosa. No lesions or ulcerations noted.  The tongue was mobile and midline, and the dentition were in good condition.    Throat - The walls of the oropharynx were smooth, pink, moist, symmetric, and had no lesions or ulcerations.  The tonsillar pillars and soft palate were symmetric.  The uvula was midline on elevation.  Ball Tongue Position III, grade 1 tonsils   Neck - No palpable enlarged fixed cervical lymph nodes.  No neck cysts or unusual tenderness to palpation.   No palpable fixed thyroid nodules or concerning goiter.  The trachea is grossly midline.   Nose - External contour is symmetric, no gross deflection or scars.  Nasal mucosa is pink and moist with no abnormal mucus.  The septum and turbinates were evaluated: mild TH, septal button in place.  No polyps, masses, or purulence noted on examination.   Heart- Regular rate  Lungs- Clear A/p      ASSESSMENT/ PLAN:    ICD-10-CM    1. Perennial allergic rhinitis  J30.89 mometasone (NASONEX) 50 MCG/ACT nasal spray   2. Nasal congestion  R09.81    3. oral allergy syndrome  T78.1XXA    4. Heat intolerance  R68.89    5. Dyspnea, unspecified type  R06.00    6. Imbalance  R26.89    7. Episodic tension-type headache, not intractable  G44.219        Continue with weekly allergy injections.     He has ongoing nasal congestion/ obstruction. Recommended use of nasal spray to reduce IT hypertrophy and aid in allergies. If no improvement, complete  scope.   Start Nasal spray- Use on daily basis.   Nasonex 2 sprays to each nostril daily.   Cotninue w/ Claritin.    Complete pulmonary function tests.   Consider further treatment options. He may benefit from a trial of Singulair.     Monitor reflux symptoms.   Continue with Dexilant  Increase water  Follow LPR precautions.   Consider scope if no improvement.     Tonsil stones:    Soda/Salt Oral Rinse     1/4 tsp. Baking Soda   1/8 tsp. Salt   1 cup Warm Water     Mix well until salt dissolves. Rinse your mouth gently 2-3 time per day, being careful not to swallow. After, rinse with plain water    Monitor headaches/ dizziness. Keep log. Possible vestibular migraines vs. Tension headaches. He may consider trial of pamelor 10 mg at HS.         Krupa Fowler PA-C  ENT  [facility], [place]

## 2022-05-06 ENCOUNTER — OFFICE VISIT (OUTPATIENT)
Dept: OTOLARYNGOLOGY | Facility: OTHER | Age: 52
End: 2022-05-06
Attending: PHYSICIAN ASSISTANT
Payer: COMMERCIAL

## 2022-05-06 VITALS
BODY MASS INDEX: 29.62 KG/M2 | TEMPERATURE: 97.3 F | HEART RATE: 81 BPM | HEIGHT: 69 IN | DIASTOLIC BLOOD PRESSURE: 76 MMHG | WEIGHT: 200 LBS | OXYGEN SATURATION: 97 % | SYSTOLIC BLOOD PRESSURE: 124 MMHG

## 2022-05-06 DIAGNOSIS — R06.00 DYSPNEA, UNSPECIFIED TYPE: ICD-10-CM

## 2022-05-06 DIAGNOSIS — G44.219 EPISODIC TENSION-TYPE HEADACHE, NOT INTRACTABLE: ICD-10-CM

## 2022-05-06 DIAGNOSIS — J30.89 PERENNIAL ALLERGIC RHINITIS: Primary | ICD-10-CM

## 2022-05-06 DIAGNOSIS — R68.89 HEAT INTOLERANCE: ICD-10-CM

## 2022-05-06 DIAGNOSIS — R09.81 NASAL CONGESTION: ICD-10-CM

## 2022-05-06 DIAGNOSIS — T78.1XXA POLLEN-FOOD ALLERGY, INITIAL ENCOUNTER: ICD-10-CM

## 2022-05-06 DIAGNOSIS — R26.89 IMBALANCE: ICD-10-CM

## 2022-05-06 PROCEDURE — 99214 OFFICE O/P EST MOD 30 MIN: CPT | Performed by: PHYSICIAN ASSISTANT

## 2022-05-06 RX ORDER — MOMETASONE FUROATE MONOHYDRATE 50 UG/1
2 SPRAY, METERED NASAL DAILY
Qty: 16 G | Refills: 11 | Status: SHIPPED | OUTPATIENT
Start: 2022-05-06

## 2022-05-06 ASSESSMENT — PAIN SCALES - GENERAL: PAINLEVEL: NO PAIN (0)

## 2022-05-06 NOTE — NURSING NOTE
"Chief Complaint   Patient presents with     Allergies     Scit follow up.       Initial /76 (BP Location: Right arm, Cuff Size: Adult Regular)   Pulse 81   Temp 97.3  F (36.3  C) (Tympanic)   Ht 1.753 m (5' 9\")   Wt 90.7 kg (200 lb)   SpO2 97%   BMI 29.53 kg/m   Estimated body mass index is 29.53 kg/m  as calculated from the following:    Height as of this encounter: 1.753 m (5' 9\").    Weight as of this encounter: 90.7 kg (200 lb).  Medication Reconciliation: complete  Antoinette Perez LPN    "

## 2022-05-06 NOTE — PATIENT INSTRUCTIONS
Start Nasal spray- Use on daily basis.   Nasonex 2 sprays to each nostril daily.   Cotninue w/ Claritin.    Complete pulmonary function tests.   Continue with weekly allergy injections.   Consider additional daily medication- if there is no improvement-contact nurse.     Tonsil stones:    Soda/Salt Oral Rinse     1/4 tsp. Baking Soda   1/8 tsp. Salt   1 cup Warm Water     Mix well until salt dissolves. Rinse your mouth gently 2-3 time per day, being careful not to swallow. After, rinse with plain water    Monitor headaches/ dizziness. Keep log. Possible vestibular migraines vs. Tension headaches.       Thank you for allowing Krupa Fowler PA-C and our ENT team to participate in your care.  If your medications are too expensive, please give the nurse a call.  We can possibly change this medication.  If you have a scheduling or an appointment question please contact our Health Unit Coordinator at [phone], Ext. [phone].    ALL nursing questions or concerns can be directed to your ENT nurse at: [phone] [facility]    Olfactory training is performed over 16 to 32 weeks and offers improvement in 30 to 50% of patients  Refer to [employer]  [email or web address].abscent.org  This includes reintroducing 4 scents which are sniffed for 20 seconds 2 times daily over 3 to 6 months    Adult lifestyle changes to prevent LPR reviewed     Avoid eating and drinking within two to three hours prior to bedtime   Do not drink alcohol   Eat small meals and slowly   Limit problem foods:    o Caffeine  o Carbonated drinks  o Chocolate  o Peppermint  o Tomato  o Citrus fruits  o Fatty and fried foods     Lose weight   Quit smoking   Wear loose clothing

## 2022-05-06 NOTE — LETTER
5/6/2022         RE: Shane Rai  [address] [facility] [address]        Dear Colleague,    Thank you for referring your patient, Shane Rai, to the [facility]. Please see a copy of my visit note below.    Chief Complaint   Patient presents with     Allergies     Scit follow up.     Patient present to ENT for follow-up of allergy immunotherapy.  Alexis was last seen in ENT on 6/18/2021 and was doing fairly well.  He was recommended to continue with Claritin on a daily basis to maintain weekly allergy injections.  He was instructed he may consider Flonase and then 1 spray daily or as needed.    Today, Shane has been doing well with his allergies and injections. He has felt worsening nasal obstruction.   He reports if he is doing physical activity- he feels voice hoarseness/ throat changes and resolved with sitting after about 20-30 minutes. He has used inhaler but hard to tell if it resolves any symptoms.   He feels like his breathing is restricted and can not get enough air.   He has been taking Claritin daily.   No recent use of Nasal spray.     Shane has rinsed before in the past and felt that some would remain present.   He has been using Flonase and had some nasal burning. He has since felt the sensation with some relief. He has tried Nasacort in past.   He was started on BID AH, but was fatigued. He is on Claritin daily. Energy has been better   He has felt like an itch in his lungs. He felt like a tongue/ sensation.   [employer] food panel was overall negative he did have low-grade positives to egg white and milk.     MQT- 3/19/21  Dilution #6- Birch  Dilution #5-Grass, oak, mold, cat, dog, dust  Dilution #2- weeds, maple, elm, cottonwood, molds.      Food Panel completed- Pending.   No history of perennial allergic rhinitis in 2012 he underwent skin testing with Dr. Tomas that showed high sensitivity to tree grass cocklebur mugwort mild dust  He had covid in mid December and still  "notes hyoposmia and occasional shortness of breath   He notes occasion choking with oral intake     Apples, cherries, almonds all based on prior testing.  No hx of anaphylaxis with food intake  With green apples he had oral swelling of the lips, no anaphylaxis  Juanita cherries caused lip swelling, no other symptoms  No problems with almond intake         He does have reflux.   Currently on Dexilant.   Reports worsens with certain food.   Denies dysphagia or dysphonia.   + globus sensation.   +Throat clearing.     Water- few bottles.   Caffeine- 1 cup coffee  ETOH- Rare  Tobacco- None.   Reflux- Yes     Past Medical History:   Diagnosis Date     Gastro-oesophageal reflux disease      Hypothyroidism         Allergies   Allergen Reactions     Other Food Allergy Other (See Comments)     Almonds, cherries, apples and walnuts. Apples and cherries cause lip swelling.     Seasonal Allergies      Birch trees, oak trees, grass pollen, mugwart     Zyrtec [Cetirizine] Other (See Comments)     HOT BURNING feeling over entire body.     Current Outpatient Medications   Medication     albuterol (PROAIR HFA/PROVENTIL HFA/VENTOLIN HFA) 108 (90 Base) MCG/ACT inhaler     budesonide (PULMICORT) 0.5 MG/2ML neb solution     clobetasol (TEMOVATE) 0.05 % ointment     desonide (DESOWEN) 0.05 % external cream     dexlansoprazole (DEXILANT) 30 MG CPDR CR capsule     EPINEPHrine (ANY BX GENERIC EQUIV) 0.3 MG/0.3ML injection 2-pack     EPINEPHrine (ANY BX GENERIC EQUIV) 0.3 MG/0.3ML injection 2-pack     fluticasone (FLONASE) 50 MCG/ACT nasal spray     LEVOTHYROXINE SODIUM PO     mometasone (NASONEX) 50 MCG/ACT nasal spray     ORDER FOR ALLERGEN IMMUNOTHERAPY     No current facility-administered medications for this visit.     >ROS- SEE HPI  /76 (BP Location: Right arm, Cuff Size: Adult Regular)   Pulse 81   Temp 97.3  F (36.3  C) (Tympanic)   Ht 1.753 m (5' 9\")   Wt 90.7 kg (200 lb)   SpO2 97%   BMI 29.53 kg/m      Head and Face " - Normocephalic and atraumatic, with no gross asymmetry noted.  The facial nerve is intact, with strong symmetric movements.  Voice and Breathing - The patient was breathing comfortably without the use of accessory muscles. There was no wheezing, stridor, or stertor.  The patients voice was clear and strong, and had appropriate pitch and quality.  No trevor peripheral digital clubbing or cyanosis   Ears -The external auditory canals are patent, the tympanic membranes are intact without effusion, retraction or mass.  Bony landmarks are intact.  Eyes - Extraocular movements intact, and the pupils were reactive to light.  Sclera were not icteric or injected, conjunctiva were pink and moist.  Mouth - Examination of the oral cavity showed pink, healthy oral mucosa. No lesions or ulcerations noted.  The tongue was mobile and midline, and the dentition were in good condition.    Throat - The walls of the oropharynx were smooth, pink, moist, symmetric, and had no lesions or ulcerations.  The tonsillar pillars and soft palate were symmetric.  The uvula was midline on elevation.  Ball Tongue Position III, grade 1 tonsils   Neck - No palpable enlarged fixed cervical lymph nodes.  No neck cysts or unusual tenderness to palpation.   No palpable fixed thyroid nodules or concerning goiter.  The trachea is grossly midline.   Nose - External contour is symmetric, no gross deflection or scars.  Nasal mucosa is pink and moist with no abnormal mucus.  The septum and turbinates were evaluated: mild TH, septal button in place.  No polyps, masses, or purulence noted on examination.   Heart- Regular rate  Lungs- Clear A/p      ASSESSMENT/ PLAN:    ICD-10-CM    1. Perennial allergic rhinitis  J30.89 mometasone (NASONEX) 50 MCG/ACT nasal spray   2. Nasal congestion  R09.81    3. oral allergy syndrome  T78.1XXA    4. Heat intolerance  R68.89    5. Dyspnea, unspecified type  R06.00    6. Imbalance  R26.89    7. Episodic tension-type  headache, not intractable  G44.219        Continue with weekly allergy injections.     He has ongoing nasal congestion/ obstruction. Recommended use of nasal spray to reduce IT hypertrophy and aid in allergies. If no improvement, complete scope.   Start Nasal spray- Use on daily basis.   Nasonex 2 sprays to each nostril daily.   Cotninue w/ Claritin.    Complete pulmonary function tests.   Consider further treatment options. He may benefit from a trial of Singulair.     Monitor reflux symptoms.   Continue with Dexilant  Increase water  Follow LPR precautions.   Consider scope if no improvement.     Tonsil stones:    Soda/Salt Oral Rinse     1/4 tsp. Baking Soda   1/8 tsp. Salt   1 cup Warm Water     Mix well until salt dissolves. Rinse your mouth gently 2-3 time per day, being careful not to swallow. After, rinse with plain water    Monitor headaches/ dizziness. Keep log. Possible vestibular migraines vs. Tension headaches. He may consider trial of pamelor 10 mg at HS.         Krupa Fowler PA-C  ENT  [facility], [place]          Again, thank you for allowing me to participate in the care of your patient.        Sincerely,        Krupa Fowler PA-C

## 2022-05-11 ENCOUNTER — ALLIED HEALTH/NURSE VISIT (OUTPATIENT)
Dept: ALLERGY | Facility: OTHER | Age: 52
End: 2022-05-11
Attending: PHYSICIAN ASSISTANT
Payer: COMMERCIAL

## 2022-05-11 DIAGNOSIS — J30.89 PERENNIAL ALLERGIC RHINITIS: Primary | ICD-10-CM

## 2022-05-11 PROCEDURE — 95117 IMMUNOTHERAPY INJECTIONS: CPT

## 2022-05-11 NOTE — PROGRESS NOTES
Prior to injection verified pt identity using pt name and date of birth.    Allergy injection/s given and charted on paper allergy flow sheet.  Patient left AMA, signing form, not staying for the observation period.    Clyde Rock RN on 5/11/2022 at 4:15 PM

## 2022-05-18 ENCOUNTER — ALLIED HEALTH/NURSE VISIT (OUTPATIENT)
Dept: ALLERGY | Facility: OTHER | Age: 52
End: 2022-05-18
Attending: PHYSICIAN ASSISTANT
Payer: COMMERCIAL

## 2022-05-18 DIAGNOSIS — J30.89 PERENNIAL ALLERGIC RHINITIS: Primary | ICD-10-CM

## 2022-05-18 PROCEDURE — 95117 IMMUNOTHERAPY INJECTIONS: CPT

## 2022-05-18 NOTE — PROGRESS NOTES
Prior to injection verified pt identity using pt name and date of birth.    Allergy injection/s given and charted on paper allergy flow sheet.  Patient left AMA, signing form, not staying for the observation period.    Clyde Rock RN on 5/18/2022 at 4:16 PM

## 2022-05-25 ENCOUNTER — MEDICAL CORRESPONDENCE (OUTPATIENT)
Dept: MRI IMAGING | Facility: HOSPITAL | Age: 52
End: 2022-05-25
Payer: COMMERCIAL

## 2022-06-01 ENCOUNTER — ALLIED HEALTH/NURSE VISIT (OUTPATIENT)
Dept: ALLERGY | Facility: OTHER | Age: 52
End: 2022-06-01
Attending: PHYSICIAN ASSISTANT
Payer: COMMERCIAL

## 2022-06-01 DIAGNOSIS — J30.89 PERENNIAL ALLERGIC RHINITIS: Primary | ICD-10-CM

## 2022-06-01 PROCEDURE — 95117 IMMUNOTHERAPY INJECTIONS: CPT

## 2022-06-01 NOTE — PROGRESS NOTES
Prior to injection verified pt identity using pt name and date of birth.    Allergy injection/s given and charted on paper allergy flow sheet.  Patient left AMA, signing form, not staying for the observation period.    Clyde Rock RN on 6/1/2022 at 4:17 PM

## 2022-06-08 ENCOUNTER — ALLIED HEALTH/NURSE VISIT (OUTPATIENT)
Dept: ALLERGY | Facility: OTHER | Age: 52
End: 2022-06-08
Attending: PHYSICIAN ASSISTANT
Payer: COMMERCIAL

## 2022-06-08 ENCOUNTER — HOSPITAL ENCOUNTER (OUTPATIENT)
Dept: MRI IMAGING | Facility: HOSPITAL | Age: 52
Discharge: HOME | End: 2022-06-08
Attending: ORTHOPAEDIC SURGERY | Admitting: ORTHOPAEDIC SURGERY
Payer: COMMERCIAL

## 2022-06-08 DIAGNOSIS — M25.362 INSTABILITY OF LEFT KNEE JOINT: ICD-10-CM

## 2022-06-08 DIAGNOSIS — M25.562 LEFT KNEE PAIN: ICD-10-CM

## 2022-06-08 DIAGNOSIS — J30.89 PERENNIAL ALLERGIC RHINITIS: Primary | ICD-10-CM

## 2022-06-08 DIAGNOSIS — M25.662 KNEE STIFFNESS, LEFT: ICD-10-CM

## 2022-06-08 DIAGNOSIS — R53.1 WEAK: ICD-10-CM

## 2022-06-08 PROCEDURE — 73721 MRI JNT OF LWR EXTRE W/O DYE: CPT | Mod: LT

## 2022-06-08 PROCEDURE — 95117 IMMUNOTHERAPY INJECTIONS: CPT

## 2022-06-08 NOTE — PROGRESS NOTES
Prior to injection verified pt identity using pt name and date of birth.    Allergy injection/s given and charted on paper allergy flow sheet.  Patient left AMA, signing form, not staying for the observation period.    Clyde Rock RN on 6/8/2022 at 4:08 PM

## 2022-06-15 ENCOUNTER — ALLIED HEALTH/NURSE VISIT (OUTPATIENT)
Dept: ALLERGY | Facility: OTHER | Age: 52
End: 2022-06-15
Attending: PHYSICIAN ASSISTANT
Payer: COMMERCIAL

## 2022-06-15 DIAGNOSIS — J30.89 PERENNIAL ALLERGIC RHINITIS: Primary | ICD-10-CM

## 2022-06-15 PROCEDURE — 95117 IMMUNOTHERAPY INJECTIONS: CPT

## 2022-06-15 NOTE — PROGRESS NOTES
Prior to injection verified pt identity using pt name and date of birth.    Allergy injection/s given and charted on paper allergy flow sheet.  Patient left AMA, signing form, not staying for the observation period.    Clyde Rock RN on 6/15/2022 at 4:18 PM

## 2022-06-24 ENCOUNTER — ALLIED HEALTH/NURSE VISIT (OUTPATIENT)
Dept: ALLERGY | Facility: OTHER | Age: 52
End: 2022-06-24
Attending: PHYSICIAN ASSISTANT
Payer: COMMERCIAL

## 2022-06-24 DIAGNOSIS — J30.89 PERENNIAL ALLERGIC RHINITIS: Primary | ICD-10-CM

## 2022-06-24 PROCEDURE — 95117 IMMUNOTHERAPY INJECTIONS: CPT

## 2022-06-24 NOTE — PROGRESS NOTES
Prior to injection verified pt identity using pt name and date of birth.    Allergy injection/s given and charted on paper allergy flow sheet.  Patient left AMA, signing form, not staying for the observation period.    Clyde Rock RN on 6/24/2022 at 3:59 PM

## 2022-07-06 ENCOUNTER — ALLIED HEALTH/NURSE VISIT (OUTPATIENT)
Dept: ALLERGY | Facility: OTHER | Age: 52
End: 2022-07-06
Attending: PHYSICIAN ASSISTANT
Payer: COMMERCIAL

## 2022-07-06 DIAGNOSIS — J30.89 PERENNIAL ALLERGIC RHINITIS: Primary | ICD-10-CM

## 2022-07-06 PROCEDURE — 95117 IMMUNOTHERAPY INJECTIONS: CPT

## 2022-07-06 NOTE — PROGRESS NOTES
Prior to injection verified pt identity using pt name and date of birth.    Allergy injection/s given and charted on paper allergy flow sheet.  Patient left AMA, signing form, not staying for the observation period.    Clyde Rock RN on 7/6/2022 at 4:13 PM

## 2022-07-15 ENCOUNTER — ALLIED HEALTH/NURSE VISIT (OUTPATIENT)
Dept: ALLERGY | Facility: OTHER | Age: 52
End: 2022-07-15
Attending: PHYSICIAN ASSISTANT
Payer: COMMERCIAL

## 2022-07-15 DIAGNOSIS — J30.89 PERENNIAL ALLERGIC RHINITIS: Primary | ICD-10-CM

## 2022-07-15 PROCEDURE — 95117 IMMUNOTHERAPY INJECTIONS: CPT

## 2022-07-15 NOTE — PROGRESS NOTES
Prior to injection verified pt identity using pt name and date of birth.    Allergy injection/s given and charted on paper allergy flow sheet.  Patient left AMA, signing form, not staying for the observation period.    Clyde Rock RN on 7/15/2022 at 3:52 PM

## 2022-07-22 ENCOUNTER — ALLIED HEALTH/NURSE VISIT (OUTPATIENT)
Dept: ALLERGY | Facility: OTHER | Age: 52
End: 2022-07-22
Attending: PHYSICIAN ASSISTANT
Payer: COMMERCIAL

## 2022-07-22 DIAGNOSIS — J30.89 PERENNIAL ALLERGIC RHINITIS: Primary | ICD-10-CM

## 2022-07-22 PROCEDURE — 95117 IMMUNOTHERAPY INJECTIONS: CPT

## 2022-07-22 NOTE — PROGRESS NOTES
Prior to injection verified pt identity using pt name and date of birth.    Allergy injection/s given and charted on paper allergy flow sheet.  Patient left AMA, signing form, not staying for the observation period.    Clyde Rock RN on 7/22/2022 at 10:57 AM

## 2022-08-03 ENCOUNTER — ALLIED HEALTH/NURSE VISIT (OUTPATIENT)
Dept: ALLERGY | Facility: OTHER | Age: 52
End: 2022-08-03
Attending: PHYSICIAN ASSISTANT
Payer: COMMERCIAL

## 2022-08-03 DIAGNOSIS — J30.89 PERENNIAL ALLERGIC RHINITIS: Primary | ICD-10-CM

## 2022-08-03 PROCEDURE — 95117 IMMUNOTHERAPY INJECTIONS: CPT

## 2022-08-03 NOTE — PROGRESS NOTES
Prior to injection verified pt identity using pt name and date of birth.    Allergy injection/s given and charted on paper allergy flow sheet.  Patient left AMA, signing form, not staying for the observation period.    Clyde Rock RN on 8/3/2022 at 3:58 PM

## 2022-08-12 ENCOUNTER — OFFICE VISIT (OUTPATIENT)
Dept: ALLERGY | Facility: OTHER | Age: 52
End: 2022-08-12
Attending: PHYSICIAN ASSISTANT
Payer: COMMERCIAL

## 2022-08-12 DIAGNOSIS — J30.89 PERENNIAL ALLERGIC RHINITIS: Primary | ICD-10-CM

## 2022-08-12 PROCEDURE — 95117 IMMUNOTHERAPY INJECTIONS: CPT

## 2022-08-12 NOTE — PROGRESS NOTES
Prior to injection verified pt identity using pt name and date of birth.    Allergy injection/s given and charted on paper allergy flow sheet.  Patient left AMA, signing form, not staying for the observation period.    Clyde Rock RN on 8/12/2022 at 9:09 AM

## 2022-08-19 ENCOUNTER — ALLIED HEALTH/NURSE VISIT (OUTPATIENT)
Dept: ALLERGY | Facility: OTHER | Age: 52
End: 2022-08-19
Attending: PHYSICIAN ASSISTANT
Payer: COMMERCIAL

## 2022-08-19 DIAGNOSIS — J30.89 PERENNIAL ALLERGIC RHINITIS: Primary | ICD-10-CM

## 2022-08-19 PROCEDURE — 95117 IMMUNOTHERAPY INJECTIONS: CPT

## 2022-08-19 NOTE — PROGRESS NOTES
Prior to injection verified pt identity using pt name and date of birth.    Allergy injection/s given and charted on paper allergy flow sheet.  Patient left AMA, signing form, not staying for the observation period.    Clyde Rock RN on 8/19/2022 at 8:21 AM

## 2022-08-26 ENCOUNTER — OFFICE VISIT (OUTPATIENT)
Dept: ALLERGY | Facility: OTHER | Age: 52
End: 2022-08-26
Attending: PHYSICIAN ASSISTANT
Payer: COMMERCIAL

## 2022-08-26 DIAGNOSIS — J30.89 PERENNIAL ALLERGIC RHINITIS: Primary | ICD-10-CM

## 2022-08-26 PROCEDURE — 95117 IMMUNOTHERAPY INJECTIONS: CPT

## 2022-08-26 NOTE — PROGRESS NOTES
Prior to injection verified pt identity using pt name and date of birth.    Allergy injection/s given and charted on paper allergy flow sheet.  Patient left AMA, signing form, not staying for the observation period.    Lucila Rai RN on 8/26/2022 at 8:30 AM

## 2022-09-02 ENCOUNTER — ALLIED HEALTH/NURSE VISIT (OUTPATIENT)
Dept: ALLERGY | Facility: OTHER | Age: 52
End: 2022-09-02
Attending: PHYSICIAN ASSISTANT
Payer: COMMERCIAL

## 2022-09-02 DIAGNOSIS — J30.89 PERENNIAL ALLERGIC RHINITIS: Primary | ICD-10-CM

## 2022-09-02 PROCEDURE — 95117 IMMUNOTHERAPY INJECTIONS: CPT

## 2022-09-02 NOTE — PROGRESS NOTES
Prior to injection verified pt identity using pt name and date of birth.    Allergy injection/s given and charted on paper allergy flow sheet.  Patient left AMA, signing form, not staying for the observation period.    Clyde Rock RN on 9/2/2022 at 8:31 AM

## 2022-09-04 ENCOUNTER — HEALTH MAINTENANCE LETTER (OUTPATIENT)
Age: 52
End: 2022-09-04

## 2022-09-06 ENCOUNTER — HOSPITAL ENCOUNTER (OUTPATIENT)
Dept: RESPIRATORY THERAPY | Facility: HOSPITAL | Age: 52
Discharge: HOME | End: 2022-09-06
Attending: PHYSICIAN ASSISTANT | Admitting: INTERNAL MEDICINE
Payer: COMMERCIAL

## 2022-09-06 DIAGNOSIS — R06.00 DYSPNEA, UNSPECIFIED TYPE: ICD-10-CM

## 2022-09-06 DIAGNOSIS — R68.89 HEAT INTOLERANCE: ICD-10-CM

## 2022-09-06 LAB — HGB BLD-MCNC: 15.4 G/DL (ref 13.3–17.7)

## 2022-09-06 PROCEDURE — 94729 DIFFUSING CAPACITY: CPT

## 2022-09-06 PROCEDURE — 94726 PLETHYSMOGRAPHY LUNG VOLUMES: CPT | Mod: 26 | Performed by: INTERNAL MEDICINE

## 2022-09-06 PROCEDURE — 94010 BREATHING CAPACITY TEST: CPT

## 2022-09-06 PROCEDURE — 36415 COLL VENOUS BLD VENIPUNCTURE: CPT | Performed by: PHYSICIAN ASSISTANT

## 2022-09-06 PROCEDURE — 85018 HEMOGLOBIN: CPT | Performed by: PHYSICIAN ASSISTANT

## 2022-09-06 PROCEDURE — 94726 PLETHYSMOGRAPHY LUNG VOLUMES: CPT

## 2022-09-06 PROCEDURE — 94010 BREATHING CAPACITY TEST: CPT | Mod: 26 | Performed by: INTERNAL MEDICINE

## 2022-09-06 PROCEDURE — 94729 DIFFUSING CAPACITY: CPT | Mod: 26 | Performed by: INTERNAL MEDICINE

## 2022-09-07 ENCOUNTER — ALLIED HEALTH/NURSE VISIT (OUTPATIENT)
Dept: ALLERGY | Facility: OTHER | Age: 52
End: 2022-09-07
Attending: PHYSICIAN ASSISTANT
Payer: COMMERCIAL

## 2022-09-07 DIAGNOSIS — J30.89 PERENNIAL ALLERGIC RHINITIS: Primary | ICD-10-CM

## 2022-09-07 PROCEDURE — 95165 ANTIGEN THERAPY SERVICES: CPT | Performed by: PHYSICIAN ASSISTANT

## 2022-09-07 NOTE — PROGRESS NOTES
Allergy serum is mixed today at [facility],  into  2  (5 ml)  multi dose vial/vials.    Allergens included were:    Ragweed  0.2 ml of dilution # 1  Pigweed  0.2 ml of dilution # 1  Mugwort 0.2 ml of dilution # 0  Kochia  0.2 ml of dilution # 0  Russian Thistle 0.2 ml of dilution # 1  Romel Grass 0.2 ml of dilution # 1  Birch mix 0.2 ml of dilution # 1  Maple Mix 0.2 of dilution # 1  Elm Mix 0.2 ml of dilution #1   Oak Mix 0.2 ml of dilution # 1  Chele Mix 0.2 ml of dilution # 0  Pine Mix 0.2 ml of dilution # 0  Eastern [place] 0.2 ml of dilution # 1  Black [place] 0.2 ml of dilution # 0  Aspen 0.2 ml of dilution # 0  Red [place] 0.2 ml of dilution # 0    Alternaria 0.2 ml of dilution # 1  Aspergillus 0.2 ml of dilution # 1  Hormodendrum 0.2 ml of dilution # 1  Helminthosporium 0.2 ml of dilution # 1  Penicillium 0.2 ml of dilution # 1  Epicoccum 0.2 ml of dilution # 1  Fusarium 0.2 ml of dilution # 0  Mucor 0.2 ml of dilution # 0  Grain Smut 0.2 ml of dilution # 0  Grass Smut 0.2 ml of dilution # 0  Cat 0.2 ml of dilution # 1  Dog 0.2 ml of dilution # 1  Feather Mix 0.2 ml of dilution # 0  Dust Mite Mix 0.2 ml of dilution # 1  Horse 0.2 ml of dilution # 0    Lucila Rai RN on 9/7/2022 at 9:54 AM

## 2022-09-09 ENCOUNTER — ALLIED HEALTH/NURSE VISIT (OUTPATIENT)
Dept: ALLERGY | Facility: OTHER | Age: 52
End: 2022-09-09
Attending: PHYSICIAN ASSISTANT
Payer: COMMERCIAL

## 2022-09-09 DIAGNOSIS — J30.89 PERENNIAL ALLERGIC RHINITIS: Primary | ICD-10-CM

## 2022-09-09 PROCEDURE — 95117 IMMUNOTHERAPY INJECTIONS: CPT

## 2022-09-09 NOTE — PROGRESS NOTES
Prior to injection patient identity verified using name and date of birth.    SVT done on right arm, measuring 8 mm.  Passed   SVT done on left arm, measuring 10 mm.  Passed     Documented on paper flowsheet.     Allergy injection/s given and charted on paper allergy flow sheet.  Patient left AMA, signing form, not staying for the observation period.    Clyde Rock RN on 9/9/2022 at 8:38 AM

## 2022-09-15 ENCOUNTER — TELEPHONE (OUTPATIENT)
Dept: OTOLARYNGOLOGY | Facility: OTHER | Age: 52
End: 2022-09-15

## 2022-09-15 DIAGNOSIS — R06.00 DYSPNEA, UNSPECIFIED TYPE: ICD-10-CM

## 2022-09-15 DIAGNOSIS — J30.89 PERENNIAL ALLERGIC RHINITIS: Primary | ICD-10-CM

## 2022-09-16 ENCOUNTER — OFFICE VISIT (OUTPATIENT)
Dept: ALLERGY | Facility: OTHER | Age: 52
End: 2022-09-16
Attending: PHYSICIAN ASSISTANT
Payer: COMMERCIAL

## 2022-09-16 DIAGNOSIS — J30.89 PERENNIAL ALLERGIC RHINITIS: Primary | ICD-10-CM

## 2022-09-16 PROCEDURE — 95117 IMMUNOTHERAPY INJECTIONS: CPT

## 2022-09-16 RX ORDER — MONTELUKAST SODIUM 10 MG/1
10 TABLET ORAL AT BEDTIME
Qty: 90 TABLET | Refills: 3 | Status: SHIPPED | OUTPATIENT
Start: 2022-09-16 | End: 2023-09-25

## 2022-09-16 NOTE — TELEPHONE ENCOUNTER
The patient was notified of these results. He would like to start Singulair. Please call in to [facility] [place].

## 2022-09-16 NOTE — PROGRESS NOTES
Prior to injection verified pt identity using pt name and date of birth.    Allergy injection/s given and charted on paper allergy flow sheet.  Patient left AMA, signing form, not staying for the observation period.    Clyde Rock RN on 9/16/2022 at 8:42 AM

## 2022-09-23 ENCOUNTER — ALLIED HEALTH/NURSE VISIT (OUTPATIENT)
Dept: ALLERGY | Facility: OTHER | Age: 52
End: 2022-09-23
Attending: PHYSICIAN ASSISTANT
Payer: COMMERCIAL

## 2022-09-23 DIAGNOSIS — J30.89 PERENNIAL ALLERGIC RHINITIS: Primary | ICD-10-CM

## 2022-09-23 PROCEDURE — 95117 IMMUNOTHERAPY INJECTIONS: CPT

## 2022-09-23 NOTE — PROGRESS NOTES
Prior to injection verified pt identity using pt name and date of birth.    Allergy injection/s given and charted on paper allergy flow sheet.  Patient left AMA, signing form, not staying for the observation period.    Lucila Rai RN on 9/23/2022 at 3:49 PM

## 2022-09-30 ENCOUNTER — ALLIED HEALTH/NURSE VISIT (OUTPATIENT)
Dept: ALLERGY | Facility: OTHER | Age: 52
End: 2022-09-30
Attending: PHYSICIAN ASSISTANT
Payer: COMMERCIAL

## 2022-09-30 DIAGNOSIS — J30.89 PERENNIAL ALLERGIC RHINITIS: Primary | ICD-10-CM

## 2022-09-30 PROCEDURE — 95117 IMMUNOTHERAPY INJECTIONS: CPT

## 2022-09-30 NOTE — PROGRESS NOTES
Prior to injection verified pt identity using pt name and date of birth.    Allergy injection/s given and charted on paper allergy flow sheet.  Patient left AMA, signing form, not staying for the observation period.    Lucila Rai RN on 9/30/2022 at 8:08 AM

## 2022-10-07 ENCOUNTER — ALLIED HEALTH/NURSE VISIT (OUTPATIENT)
Dept: ALLERGY | Facility: OTHER | Age: 52
End: 2022-10-07
Attending: PHYSICIAN ASSISTANT
Payer: COMMERCIAL

## 2022-10-07 DIAGNOSIS — J30.89 PERENNIAL ALLERGIC RHINITIS: Primary | ICD-10-CM

## 2022-10-07 PROCEDURE — 95117 IMMUNOTHERAPY INJECTIONS: CPT

## 2022-10-07 NOTE — PROGRESS NOTES
Prior to injection verified pt identity using pt name and date of birth.    Allergy injection/s given and charted on paper allergy flow sheet.  Patient left AMA, signing form, not staying for the observation period.    Clyde Rock RN on 10/7/2022 at 3:49 PM

## 2022-10-14 ENCOUNTER — ALLIED HEALTH/NURSE VISIT (OUTPATIENT)
Dept: ALLERGY | Facility: OTHER | Age: 52
End: 2022-10-14
Attending: PHYSICIAN ASSISTANT
Payer: COMMERCIAL

## 2022-10-14 DIAGNOSIS — J30.89 PERENNIAL ALLERGIC RHINITIS: Primary | ICD-10-CM

## 2022-10-14 PROCEDURE — 95117 IMMUNOTHERAPY INJECTIONS: CPT

## 2022-10-14 NOTE — PROGRESS NOTES
Prior to injection verified pt identity using pt name and date of birth.    Allergy injection/s given and charted on paper allergy flow sheet.  Patient left AMA, signing form, not staying for the observation period.    Clyde Rock RN on 10/14/2022 at 8:08 AM

## 2022-10-28 ENCOUNTER — ALLIED HEALTH/NURSE VISIT (OUTPATIENT)
Dept: ALLERGY | Facility: OTHER | Age: 52
End: 2022-10-28
Attending: PHYSICIAN ASSISTANT
Payer: COMMERCIAL

## 2022-10-28 DIAGNOSIS — J30.89 PERENNIAL ALLERGIC RHINITIS: Primary | ICD-10-CM

## 2022-10-28 PROCEDURE — 95117 IMMUNOTHERAPY INJECTIONS: CPT

## 2022-10-28 NOTE — PROGRESS NOTES
Prior to injection verified pt identity using pt name and date of birth.    Allergy injection/s given and charted on paper allergy flow sheet.  Patient left AMA, signing form, not staying for the observation period.    Clyde Rock RN on 10/28/2022 at 8:12 AM

## 2022-11-04 ENCOUNTER — ALLIED HEALTH/NURSE VISIT (OUTPATIENT)
Dept: ALLERGY | Facility: OTHER | Age: 52
End: 2022-11-04
Attending: PHYSICIAN ASSISTANT
Payer: COMMERCIAL

## 2022-11-04 DIAGNOSIS — J30.89 PERENNIAL ALLERGIC RHINITIS: Primary | ICD-10-CM

## 2022-11-04 PROCEDURE — 95117 IMMUNOTHERAPY INJECTIONS: CPT

## 2022-11-04 NOTE — PROGRESS NOTES
Prior to injection verified pt identity using pt name and date of birth.    Allergy injection/s given and charted on paper allergy flow sheet.  Patient left AMA, signing form, not staying for the observation period.    Clyde Rock RN on 11/4/2022 at 8:08 AM

## 2022-11-09 ENCOUNTER — ALLIED HEALTH/NURSE VISIT (OUTPATIENT)
Dept: ALLERGY | Facility: OTHER | Age: 52
End: 2022-11-09
Attending: PHYSICIAN ASSISTANT
Payer: COMMERCIAL

## 2022-11-09 DIAGNOSIS — J30.89 PERENNIAL ALLERGIC RHINITIS: Primary | ICD-10-CM

## 2022-11-09 PROCEDURE — 95165 ANTIGEN THERAPY SERVICES: CPT | Performed by: PHYSICIAN ASSISTANT

## 2022-11-09 NOTE — PROGRESS NOTES
Allergy serum is mixed today at [facility],  into  2  (5 ml)  multi dose vial/vials.    Allergens included were:    Ragweed  0.2 ml of dilution # 1  Pigweed  0.2 ml of dilution # 1  Mugwort 0.2 ml of dilution # 0  Kochia  0.2 ml of dilution # 0  Russian Thistle 0.2 ml of dilution # 1  Romel Grass 0.2 ml of dilution # 1  Birch mix 0.2 ml of dilution # 1  Maple Mix 0.2 of dilution # 1  Elm Mix 0.2 ml of dilution # 1  Oak Mix 0.2 ml of dilution # 1  Chele Mix 0.2 ml of dilution # 0  Pine Mix 0.2 ml of dilution # 0  Eastern [place] 0.2 ml of dilution # 1  Black [place] 0.2 ml of dilution # 0  Aspen 0.2 ml of dilution # 0  Red [place] 0.2 ml of dilution # 0    Alternaria 0.2 ml of dilution # 1  Aspergillus 0.2 ml of dilution # 1  Hormodendrum 0.2 ml of dilution # 1  Helminthosporium 0.2 ml of dilution # 1  Penicillium 0.2 ml of dilution # 1  Epicoccum 0.2 ml of dilution # 1  Fusarium 0.2 ml of dilution # 0  Mucor 0.2 ml of dilution # 0  Grain Smut 0.2 ml of dilution # 0  Grass Smut 0.2 ml of dilution # 0  Cat 0.2 ml of dilution # 1  Dog 0.2 ml of dilution # 1  Feather Mix 0.2 ml of dilution # 0  Dust Mite Mix 0.2 ml of dilution # 1  Horse 0.2 ml of dilution # 0    Lucila Rai RN on 11/9/2022 at 9:53 AM

## 2022-11-10 ENCOUNTER — ALLIED HEALTH/NURSE VISIT (OUTPATIENT)
Dept: ALLERGY | Facility: OTHER | Age: 52
End: 2022-11-10
Attending: PHYSICIAN ASSISTANT
Payer: COMMERCIAL

## 2022-11-10 DIAGNOSIS — J30.89 PERENNIAL ALLERGIC RHINITIS: Primary | ICD-10-CM

## 2022-11-10 PROCEDURE — 95117 IMMUNOTHERAPY INJECTIONS: CPT

## 2022-11-10 NOTE — PROGRESS NOTES
Prior to injection patient identity verified using name and date of birth.    SVT done on left arm, measuring 11 mm.  Passed   SVT done on right arm, measuring 9 mm.  Passed   Documented on paper flowsheet.       Allergy injection/s given and charted on paper allergy flow sheet.  Patient left AMA, signing form, not staying for the observation period.    Lucila Rai RN on 11/10/2022 at 4:39 PM

## 2022-11-17 ENCOUNTER — ALLIED HEALTH/NURSE VISIT (OUTPATIENT)
Dept: ALLERGY | Facility: OTHER | Age: 52
End: 2022-11-17
Attending: PHYSICIAN ASSISTANT
Payer: COMMERCIAL

## 2022-11-17 DIAGNOSIS — J30.89 PERENNIAL ALLERGIC RHINITIS: Primary | ICD-10-CM

## 2022-11-17 PROCEDURE — 95117 IMMUNOTHERAPY INJECTIONS: CPT

## 2022-11-17 NOTE — PROGRESS NOTES
Prior to injection verified pt identity using pt name and date of birth.    Allergy injection/s given and charted on paper allergy flow sheet.  Patient left AMA, signing form, not staying for the observation period.    Clyde Rock RN on 11/17/2022 at 4:16 PM

## 2022-11-25 ENCOUNTER — OFFICE VISIT (OUTPATIENT)
Dept: ALLERGY | Facility: OTHER | Age: 52
End: 2022-11-25
Attending: PHYSICIAN ASSISTANT
Payer: COMMERCIAL

## 2022-11-25 DIAGNOSIS — J30.89 PERENNIAL ALLERGIC RHINITIS: Primary | ICD-10-CM

## 2022-11-25 PROCEDURE — 95117 IMMUNOTHERAPY INJECTIONS: CPT

## 2022-11-25 NOTE — PROGRESS NOTES
Prior to injection verified pt identity using pt name and date of birth.    Allergy injection/s given and charted on paper allergy flow sheet.  Patient left AMA, signing form, not staying for the observation period.    Clyde Rock RN on 11/25/2022 at 3:00 PM

## 2022-12-05 ENCOUNTER — ALLIED HEALTH/NURSE VISIT (OUTPATIENT)
Dept: ALLERGY | Facility: OTHER | Age: 52
End: 2022-12-05
Attending: PHYSICIAN ASSISTANT
Payer: COMMERCIAL

## 2022-12-05 DIAGNOSIS — J30.89 PERENNIAL ALLERGIC RHINITIS: Primary | ICD-10-CM

## 2022-12-05 PROCEDURE — 95117 IMMUNOTHERAPY INJECTIONS: CPT

## 2022-12-05 NOTE — PROGRESS NOTES
Prior to injection verified pt identity using pt name and date of birth.    Allergy injection/s given and charted on paper allergy flow sheet.  Patient left AMA, signing form, not staying for the observation period.    Lucila Rai RN on 12/5/2022 at 4:26 PM

## 2022-12-12 ENCOUNTER — ALLIED HEALTH/NURSE VISIT (OUTPATIENT)
Dept: ALLERGY | Facility: OTHER | Age: 52
End: 2022-12-12
Attending: PHYSICIAN ASSISTANT
Payer: COMMERCIAL

## 2022-12-12 DIAGNOSIS — J30.89 PERENNIAL ALLERGIC RHINITIS: Primary | ICD-10-CM

## 2022-12-12 PROCEDURE — 95117 IMMUNOTHERAPY INJECTIONS: CPT

## 2022-12-12 NOTE — PROGRESS NOTES
Prior to injection verified pt identity using pt name and date of birth.    Allergy injection/s given and charted on paper allergy flow sheet.  Patient left AMA, signing form, not staying for the observation period.    Clyde Rock RN on 12/12/2022 at 4:14 PM

## 2022-12-19 ENCOUNTER — ALLIED HEALTH/NURSE VISIT (OUTPATIENT)
Dept: ALLERGY | Facility: OTHER | Age: 52
End: 2022-12-19
Attending: PHYSICIAN ASSISTANT
Payer: COMMERCIAL

## 2022-12-19 DIAGNOSIS — J30.89 PERENNIAL ALLERGIC RHINITIS: Primary | ICD-10-CM

## 2022-12-19 PROCEDURE — 95117 IMMUNOTHERAPY INJECTIONS: CPT

## 2022-12-19 NOTE — PROGRESS NOTES
Prior to injection verified pt identity using pt name and date of birth.    Allergy injection/s given and charted on paper allergy flow sheet.  Patient left AMA, signing form, not staying for the observation period.    Lucila Rai RN on 12/19/2022 at 4:17 PM

## 2022-12-27 ENCOUNTER — ALLIED HEALTH/NURSE VISIT (OUTPATIENT)
Dept: ALLERGY | Facility: OTHER | Age: 52
End: 2022-12-27
Attending: PHYSICIAN ASSISTANT
Payer: COMMERCIAL

## 2022-12-27 DIAGNOSIS — J30.89 PERENNIAL ALLERGIC RHINITIS: Primary | ICD-10-CM

## 2022-12-27 PROCEDURE — 95117 IMMUNOTHERAPY INJECTIONS: CPT

## 2022-12-27 NOTE — PROGRESS NOTES
Prior to injection verified pt identity using pt name and date of birth.    Allergy injection/s given and charted on paper allergy flow sheet.  Patient left AMA, signing form, not staying for the observation period.    Lucila Rai RN on 12/27/2022 at 4:19 PM

## 2023-01-03 ENCOUNTER — ALLIED HEALTH/NURSE VISIT (OUTPATIENT)
Dept: ALLERGY | Facility: OTHER | Age: 53
End: 2023-01-03
Attending: PHYSICIAN ASSISTANT
Payer: COMMERCIAL

## 2023-01-03 DIAGNOSIS — J30.89 PERENNIAL ALLERGIC RHINITIS: Primary | ICD-10-CM

## 2023-01-03 PROCEDURE — 95117 IMMUNOTHERAPY INJECTIONS: CPT

## 2023-01-03 NOTE — PROGRESS NOTES
Prior to injection verified pt identity using pt name and date of birth.    Allergy injection/s given and charted on paper allergy flow sheet.  Patient left AMA, signing form, not staying for the observation period. Patient had about a dime-sized bruise on left arm.     Lucila Rai RN on 1/3/2023 at 4:15 PM

## 2023-01-04 ENCOUNTER — ALLIED HEALTH/NURSE VISIT (OUTPATIENT)
Dept: ALLERGY | Facility: OTHER | Age: 53
End: 2023-01-04
Attending: PHYSICIAN ASSISTANT
Payer: COMMERCIAL

## 2023-01-04 DIAGNOSIS — J30.89 PERENNIAL ALLERGIC RHINITIS: Primary | ICD-10-CM

## 2023-01-04 PROCEDURE — 95165 ANTIGEN THERAPY SERVICES: CPT | Performed by: PHYSICIAN ASSISTANT

## 2023-01-04 NOTE — PROGRESS NOTES
Allergy serum is mixed today at [facility],  into  2  (5 ml)  multi dose vial/vials.    Allergens included were:    Ragweed  0.2 ml of dilution # 1  Pigweed  0.2 ml of dilution # 1  Mugwort 0.2 ml of dilution # 0  Kochia  0.2 ml of dilution # 0  Russian Thistle 0.2 ml of dilution # 1  Romel Grass 0.2 ml of dilution # 1  Birch mix 0.2 ml of dilution # 1  Maple Mix 0.2 of dilution # 1  Elm Mix 0.2 ml of dilution # 1  Oak Mix 0.2 ml of dilution # 1  Chele Mix 0.2 ml of dilution # 0  Pine Mix 0.2 ml of dilution # 0  Eastern [place] 0.2 ml of dilution # 1  Black [place] 0.2 ml of dilution # 0  Aspen 0.2 ml of dilution # 0  Red [place] 0.2 ml of dilution # 0    Alternaria 0.2 ml of dilution # 1  Aspergillus 0.2 ml of dilution # 1  Hormodendrum 0.2 ml of dilution # 1  Helminthosporium 0.2 ml of dilution # 1  Penicillium 0.2 ml of dilution # 1  Epicoccum 0.2 ml of dilution # 1  Fusarium 0.2 ml of dilution # 0  Mucor 0.2 ml of dilution # 0  Grain Smut 0.2 ml of dilution # 0  Grass Smut 0.2 ml of dilution # 0  Cat 0.2 ml of dilution # 1  Dog 0.2 ml of dilution # 1  Feather Mix 0.2 ml of dilution # 0  Dust Mite Mix 0.2 ml of dilution # 1  Horse 0.2 ml of dilution # 0    Lucila Rai RN on 1/4/2023 at 9:11 AM

## 2023-01-13 ENCOUNTER — ALLIED HEALTH/NURSE VISIT (OUTPATIENT)
Dept: ALLERGY | Facility: OTHER | Age: 53
End: 2023-01-13
Attending: PHYSICIAN ASSISTANT
Payer: COMMERCIAL

## 2023-01-13 DIAGNOSIS — J30.89 PERENNIAL ALLERGIC RHINITIS: Primary | ICD-10-CM

## 2023-01-13 PROCEDURE — 95117 IMMUNOTHERAPY INJECTIONS: CPT

## 2023-01-13 NOTE — PROGRESS NOTES
Prior to injection verified pt identity using pt name and date of birth.    Allergy injection/s given and charted on paper allergy flow sheet.  Patient left AMA, signing form, not staying for the observation period.    Clyde Rock RN on 1/13/2023 at 4:10 PM

## 2023-01-20 ENCOUNTER — ALLIED HEALTH/NURSE VISIT (OUTPATIENT)
Dept: ALLERGY | Facility: OTHER | Age: 53
End: 2023-01-20
Attending: PHYSICIAN ASSISTANT
Payer: COMMERCIAL

## 2023-01-20 DIAGNOSIS — J30.89 PERENNIAL ALLERGIC RHINITIS: Primary | ICD-10-CM

## 2023-01-20 PROCEDURE — 95117 IMMUNOTHERAPY INJECTIONS: CPT

## 2023-01-20 NOTE — PROGRESS NOTES
Prior to injection patient identity verified using name and date of birth.    SVT done on left arm, measuring 9 mm.  Passed.  SVT done on right arm, measuring 7 mm.  Passed.  Documented on paper flowsheet.       Allergy injection/s given and charted on paper allergy flow sheet.  Patient left AMA, signing form, not staying for the observation period.      Lucila Rai RN on 1/20/2023 at 4:33 PM

## 2023-01-27 ENCOUNTER — ALLIED HEALTH/NURSE VISIT (OUTPATIENT)
Dept: ALLERGY | Facility: OTHER | Age: 53
End: 2023-01-27
Attending: PHYSICIAN ASSISTANT
Payer: COMMERCIAL

## 2023-01-27 DIAGNOSIS — J30.89 PERENNIAL ALLERGIC RHINITIS: Primary | ICD-10-CM

## 2023-01-27 PROCEDURE — 95117 IMMUNOTHERAPY INJECTIONS: CPT

## 2023-01-27 NOTE — PROGRESS NOTES
Prior to injection verified pt identity using pt name and date of birth.    Allergy injection/s given and charted on paper allergy flow sheet.  Patient left AMA, signing form, not staying for the observation period.    Lucila Rai RN on 1/27/2023 at 4:16 PM

## 2023-02-03 ENCOUNTER — ALLIED HEALTH/NURSE VISIT (OUTPATIENT)
Dept: ALLERGY | Facility: OTHER | Age: 53
End: 2023-02-03
Attending: PHYSICIAN ASSISTANT
Payer: COMMERCIAL

## 2023-02-03 DIAGNOSIS — J30.89 PERENNIAL ALLERGIC RHINITIS: Primary | ICD-10-CM

## 2023-02-03 PROCEDURE — 95117 IMMUNOTHERAPY INJECTIONS: CPT

## 2023-02-03 NOTE — PROGRESS NOTES
Prior to injection verified pt identity using pt name and date of birth.    Allergy injection/s given and charted on paper allergy flow sheet.  Patient left AMA, signing form, not staying for the observation period.    Clyde Rock RN on 2/3/2023 at 4:19 PM

## 2023-02-10 ENCOUNTER — ALLIED HEALTH/NURSE VISIT (OUTPATIENT)
Dept: ALLERGY | Facility: OTHER | Age: 53
End: 2023-02-10
Attending: PHYSICIAN ASSISTANT
Payer: COMMERCIAL

## 2023-02-10 DIAGNOSIS — J30.89 PERENNIAL ALLERGIC RHINITIS: Primary | ICD-10-CM

## 2023-02-10 PROCEDURE — 95117 IMMUNOTHERAPY INJECTIONS: CPT

## 2023-02-10 NOTE — PROGRESS NOTES
Prior to injection verified pt identity using pt name and date of birth.    Allergy injection/s given and charted on paper allergy flow sheet.  Patient left AMA, signing form, not staying for the observation period.    Lucila Rai RN on 2/10/2023 at 4:06 PM

## 2023-02-24 ENCOUNTER — ALLIED HEALTH/NURSE VISIT (OUTPATIENT)
Dept: ALLERGY | Facility: OTHER | Age: 53
End: 2023-02-24
Attending: PHYSICIAN ASSISTANT
Payer: COMMERCIAL

## 2023-02-24 DIAGNOSIS — J30.89 PERENNIAL ALLERGIC RHINITIS: Primary | ICD-10-CM

## 2023-02-24 PROCEDURE — 95117 IMMUNOTHERAPY INJECTIONS: CPT

## 2023-02-24 NOTE — PROGRESS NOTES
Prior to injection verified pt identity using pt name and date of birth.    Allergy injection/s given and charted on paper allergy flow sheet.  Patient left AMA, signing form, not staying for the observation period.    Clyde Rock RN on 2/24/2023 at 4:08 PM

## 2023-03-03 ENCOUNTER — TRANSFERRED RECORDS (OUTPATIENT)
Dept: HEALTH INFORMATION MANAGEMENT | Facility: CLINIC | Age: 53
End: 2023-03-03

## 2023-03-03 ENCOUNTER — ALLIED HEALTH/NURSE VISIT (OUTPATIENT)
Dept: ALLERGY | Facility: OTHER | Age: 53
End: 2023-03-03
Attending: PHYSICIAN ASSISTANT
Payer: COMMERCIAL

## 2023-03-03 DIAGNOSIS — J30.89 PERENNIAL ALLERGIC RHINITIS: Primary | ICD-10-CM

## 2023-03-03 DIAGNOSIS — T78.40XD ALLERGIC REACTION, SUBSEQUENT ENCOUNTER: ICD-10-CM

## 2023-03-03 LAB
ALT SERPL-CCNC: 48 U/L (ref 18–65)
AST SERPL-CCNC: 25 U/L (ref 10–40)
CREATININE (EXTERNAL): 1.01 MG/DL (ref 0.8–1.5)
GFR ESTIMATED (EXTERNAL): 90 ML/MIN/1.73M2
GLUCOSE (EXTERNAL): 93 MG/DL (ref 60–99)
POTASSIUM (EXTERNAL): 4.4 MMOL/L (ref 3.5–5.1)
TSH SERPL-ACNC: 2.2 UIU/ML (ref 0.35–4.8)

## 2023-03-03 PROCEDURE — 95117 IMMUNOTHERAPY INJECTIONS: CPT

## 2023-03-03 NOTE — TELEPHONE ENCOUNTER
Epipen  Last Written Prescription Date: 3/19/21  Last Fill Quantity: 0.6 ml # of Refills: 1  Last Office Visit: 5/6/22

## 2023-03-03 NOTE — PROGRESS NOTES
Prior to injection verified pt identity using pt name and date of birth.    Allergy injection/s given and charted on paper allergy flow sheet.  Patient left AMA, signing form, not staying for the observation period.    Lucila Rai RN on 3/3/2023 at 4:13 PM

## 2023-03-04 RX ORDER — EPINEPHRINE 0.3 MG/.3ML
INJECTION SUBCUTANEOUS
Qty: 2 EACH | Refills: 1 | Status: SHIPPED | OUTPATIENT
Start: 2023-03-04

## 2023-03-10 ENCOUNTER — ALLIED HEALTH/NURSE VISIT (OUTPATIENT)
Dept: ALLERGY | Facility: OTHER | Age: 53
End: 2023-03-10
Attending: PHYSICIAN ASSISTANT
Payer: COMMERCIAL

## 2023-03-10 DIAGNOSIS — J30.89 PERENNIAL ALLERGIC RHINITIS: Primary | ICD-10-CM

## 2023-03-10 PROCEDURE — 95117 IMMUNOTHERAPY INJECTIONS: CPT

## 2023-03-10 NOTE — PROGRESS NOTES
Prior to injection verified pt identity using pt name and date of birth.    Allergy injection/s given and charted on paper allergy flow sheet.  Patient left AMA, signing form, not staying for the observation period.    Clyde Rock RN on 3/10/2023 at 4:12 PM

## 2023-03-17 ENCOUNTER — ALLIED HEALTH/NURSE VISIT (OUTPATIENT)
Dept: ALLERGY | Facility: OTHER | Age: 53
End: 2023-03-17
Attending: FAMILY MEDICINE
Payer: COMMERCIAL

## 2023-03-17 ENCOUNTER — TELEPHONE (OUTPATIENT)
Dept: ALLERGY | Facility: OTHER | Age: 53
End: 2023-03-17

## 2023-03-17 DIAGNOSIS — J30.89 PERENNIAL ALLERGIC RHINITIS: Primary | ICD-10-CM

## 2023-03-17 PROCEDURE — 95117 IMMUNOTHERAPY INJECTIONS: CPT

## 2023-03-17 NOTE — PROGRESS NOTES
Prior to injection verified pt identity using pt name and date of birth.    Allergy injection/s given and charted on paper allergy flow sheet.  Patient left AMA, signing form, not staying for the observation period.    Lucila Rai RN on 3/17/2023 at 4:14 PM

## 2023-03-17 NOTE — TELEPHONE ENCOUNTER
Patient needs an updated order for allergy injections.  New order pended.  Please review and sign, thank you!    Lucila Rai RN on 3/17/2023 at 4:20 PM

## 2023-03-31 ENCOUNTER — ALLIED HEALTH/NURSE VISIT (OUTPATIENT)
Dept: ALLERGY | Facility: OTHER | Age: 53
End: 2023-03-31
Attending: PHYSICIAN ASSISTANT
Payer: COMMERCIAL

## 2023-03-31 DIAGNOSIS — J30.89 PERENNIAL ALLERGIC RHINITIS: Primary | ICD-10-CM

## 2023-03-31 DIAGNOSIS — J30.9 ALLERGIC RHINITIS: ICD-10-CM

## 2023-03-31 PROCEDURE — 95117 IMMUNOTHERAPY INJECTIONS: CPT

## 2023-03-31 NOTE — PROGRESS NOTES
Prior to injection verified pt identity using pt name and date of birth.    Allergy injection/s given and charted on paper allergy flow sheet.  Patient left AMA, signing form, not staying for the observation period.    Clyde Rock RN on 3/31/2023 at 4:14 PM

## 2023-04-05 ENCOUNTER — ALLIED HEALTH/NURSE VISIT (OUTPATIENT)
Dept: ALLERGY | Facility: OTHER | Age: 53
End: 2023-04-05
Attending: PHYSICIAN ASSISTANT
Payer: COMMERCIAL

## 2023-04-05 DIAGNOSIS — J30.89 PERENNIAL ALLERGIC RHINITIS: Primary | ICD-10-CM

## 2023-04-05 PROCEDURE — 95165 ANTIGEN THERAPY SERVICES: CPT | Performed by: PHYSICIAN ASSISTANT

## 2023-04-05 NOTE — PROGRESS NOTES
Allergy serum is mixed today at [facility],  into  2  (5 ml)  multi dose vial/vials.    Allergens included were:    Ragweed  0.2 ml of dilution # 1  Pigweed  0.2 ml of dilution # 1  Mugwort 0.2 ml of dilution # 0  Kochia  0.2 ml of dilution # 0  Russian Thistle 0.2 ml of dilution # 1  Romel Grass 0.2 ml of dilution # 1  Birch mix 0.2 ml of dilution # 1  Maple Mix 0.2 of dilution # 1  Elm Mix 0.2 ml of dilution # 1  Oak Mix 0.2 ml of dilution # 1  Chele Mix 0.2 ml of dilution # 0  Pine Mix 0.2 ml of dilution # 0  Eastern [place] 0.2 ml of dilution # 1  Black [place] 0.2 ml of dilution # 0  Aspen 0.2 ml of dilution # 0  Red [place] 0.2 ml of dilution # 0    Alternaria 0.2 ml of dilution # 1  Aspergillus 0.2 ml of dilution # 1  Hormodendrum 0.2 ml of dilution # 1  Helminthosporium 0.2 ml of dilution # 1  Penicillium 0.2 ml of dilution # 1  Epicoccum 0.2 ml of dilution # 1  Fusarium 0.2 ml of dilution # 0  Mucor 0.2 ml of dilution # 0  Grain Smut 0.2 ml of dilution # 0  Grass Smut 0.2 ml of dilution # 0  Cat 0.2 ml of dilution # 1  Dog 0.2 ml of dilution # 1  Feather Mix 0.2 ml of dilution # 0  Dust Mite Mix 0.2 ml of dilution # 1  Horse 0.2 ml of dilution # 0    Clyde Rock RN on 4/5/2023 at 10:05 AM

## 2023-04-07 ENCOUNTER — ALLIED HEALTH/NURSE VISIT (OUTPATIENT)
Dept: ALLERGY | Facility: OTHER | Age: 53
End: 2023-04-07
Attending: PHYSICIAN ASSISTANT
Payer: COMMERCIAL

## 2023-04-07 DIAGNOSIS — J30.89 PERENNIAL ALLERGIC RHINITIS: Primary | ICD-10-CM

## 2023-04-07 PROCEDURE — 95117 IMMUNOTHERAPY INJECTIONS: CPT

## 2023-04-07 NOTE — PROGRESS NOTES
Prior to injection verified pt identity using pt name and date of birth.    SVT done on right arm, measuring 9 mm.  Passed  SVT done on left arm, measuring 9 mm.  Passed    Documented on paper flowsheet.     Allergy injection/s given and charted on paper allergy flow sheet.  Patient left AMA, signing form, not staying for the observation period.    Clyde Rock RN on 4/7/2023 at 11:15 AM

## 2023-04-12 NOTE — PROGRESS NOTES
Chief Complaint   Patient presents with     Allergies     Scit follow up       Patient present to ENT for follow-up of allergy immunotherapy.     Patient was last seen in ENT on 5/6/2022 for routine allergy immunotherapy follow-up.     Today, Shane has been doing well with his allergies and injections. He has been doing fairly well and has felt some intermittent w/ exertion. He feels like his breathing is labored. He feels intermittently not being able to complete a full breath even while just sitting. He feels restricted while breathing.   PFT was completed and was normal.    He feels symptoms mostly occur in the AM.   He reports there was a Thyroid US completed- US revealed     He has felt his allergy symptoms have been doing fairly well  Tolerating injections without concerns.     Completed stress test in 2015 and was within normal range.   Completed cardiologist eval and was stable visit.     Shane has rinsed before in the past and felt that some would remain present.   He has been using Flonase and had some nasal burning. He has since felt the sensation with some relief. He has tried Nasacort in past.   He was started on BID AH, but was fatigued. He is on Claritin daily. Energy has been better   He has felt like an itch in his lungs. He felt like a tongue/ sensation.   [employer] food panel was overall negative he did have low-grade positives to egg white and milk.     MQT- 3/19/21  Dilution #6- Birch  Dilution #5-Grass, oak, mold, cat, dog, dust  Dilution #2- weeds, maple, elm, cottonwood, molds.      Food Panel completed- Pending.   No history of perennial allergic rhinitis in 2012 he underwent skin testing with Dr. Tomas that showed high sensitivity to tree grass cocklebur mugwort mild dust  He had covid in mid December and still notes hyoposmia and occasional shortness of breath   He notes occasion choking with oral intake     Apples, cherries, almonds all based on prior testing.  No hx of anaphylaxis with  "food intake  With green apples he had oral swelling of the lips, no anaphylaxis  Juanita cherries caused lip swelling, no other symptoms  No problems with almond intake     He has been on dexilant for GERD which is generally good control.       Past Medical History:   Diagnosis Date     Gastro-oesophageal reflux disease      Hypothyroidism         Allergies   Allergen Reactions     Other Food Allergy Other (See Comments)     Almonds, cherries, apples and walnuts. Apples and cherries cause lip swelling.     Seasonal Allergies      Birch trees, oak trees, grass pollen, mugwart     Zyrtec [Cetirizine] Other (See Comments)     HOT BURNING feeling over entire body.     Current Outpatient Medications   Medication     albuterol (PROAIR HFA/PROVENTIL HFA/VENTOLIN HFA) 108 (90 Base) MCG/ACT inhaler     budesonide (PULMICORT) 0.5 MG/2ML neb solution     clobetasol (TEMOVATE) 0.05 % ointment     desonide (DESOWEN) 0.05 % external cream     dexlansoprazole (DEXILANT) 30 MG CPDR CR capsule     EPINEPHrine (ANY BX GENERIC EQUIV) 0.3 MG/0.3ML injection 2-pack     fluticasone (FLONASE) 50 MCG/ACT nasal spray     LEVOTHYROXINE SODIUM PO     mometasone (NASONEX) 50 MCG/ACT nasal spray     montelukast (SINGULAIR) 10 MG tablet     ORDER FOR ALLERGEN IMMUNOTHERAPY     No current facility-administered medications for this visit.     ROS- SEE HPI  /68 (BP Location: Left arm, Patient Position: Sitting, Cuff Size: Adult Large)   Pulse 60   Temp 97.7  F (36.5  C) (Tympanic)   Ht 1.753 m (5' 9\")   Wt 89.8 kg (198 lb)   SpO2 97%   BMI 29.24 kg/m      Head and Face - Normocephalic and atraumatic, with no gross asymmetry noted.  The facial nerve is intact, with strong symmetric movements.  Voice and Breathing - The patient was breathing comfortably without the use of accessory muscles. There was no wheezing, stridor, or stertor.  The patients voice was clear and strong, and had appropriate pitch and quality.  No trevor peripheral digital " clubbing or cyanosis   Ears -The external auditory canals are patent, the tympanic membranes are intact without effusion, retraction or mass.  Bony landmarks are intact.  Eyes - Extraocular movements intact, and the pupils were reactive to light.  Sclera were not icteric or injected, conjunctiva were pink and moist.  Mouth - Examination of the oral cavity showed pink, healthy oral mucosa. No lesions or ulcerations noted.  The tongue was mobile and midline, and the dentition were in good condition.    Throat - The walls of the oropharynx were smooth, pink, moist, symmetric, and had no lesions or ulcerations.  The tonsillar pillars and soft palate were symmetric.  The uvula was midline on elevation.  Ball Tongue Position III, grade 1 tonsils   Neck - No palpable enlarged fixed cervical lymph nodes.  No neck cysts or unusual tenderness to palpation.   No palpable fixed thyroid nodules or concerning goiter.  The trachea is grossly midline.   Nose - External contour is symmetric, no gross deflection or scars.  Nasal mucosa is pink and moist with no abnormal mucus.  The septum and turbinates were evaluated: mild TH, septal button in place.  No polyps, masses, or purulence noted on examination.   Heart- Regular rate  Lungs- Clear A/p        ASSESSMENT/ PLAN:    ICD-10-CM    1. Perennial allergic rhinitis  J30.89 albuterol (PROAIR HFA/PROVENTIL HFA/VENTOLIN HFA) 108 (90 Base) MCG/ACT inhaler     fluticasone-salmeterol (ADVAIR) 250-50 MCG/ACT inhaler      2. Dyspnea, unspecified type  R06.00 albuterol (PROAIR HFA/PROVENTIL HFA/VENTOLIN HFA) 108 (90 Base) MCG/ACT inhaler     fluticasone-salmeterol (ADVAIR) 250-50 MCG/ACT inhaler      3. Nasal congestion  R09.81       4. oral allergy syndrome  T78.1XXA         Continue with daily antihistamine and Singulair.  Use budesonide rinse as needed.  Continue with weekly allergy injections  Advance to every other week injections in August 2023.     Start Advair 1 puff twice a  day  Use albuterol as needed.     Release of record form thyroid US.   Continue with daily anithistminea  Use Nasonex as needed      Soda/Salt Oral Rinse     1/4 tsp. Baking Soda   1/8 tsp. Salt   1 cup Warm Water     Mix well until salt dissolves. Rinse your mouth gently 2-3 time per day, being careful not to swallow. After, rinse with plain water.     Krupa Fowler PA-C  ENT  [facility], [place]

## 2023-04-14 ENCOUNTER — OFFICE VISIT (OUTPATIENT)
Dept: OTOLARYNGOLOGY | Facility: OTHER | Age: 53
End: 2023-04-14
Attending: PHYSICIAN ASSISTANT
Payer: COMMERCIAL

## 2023-04-14 ENCOUNTER — ALLIED HEALTH/NURSE VISIT (OUTPATIENT)
Dept: ALLERGY | Facility: OTHER | Age: 53
End: 2023-04-14
Attending: PHYSICIAN ASSISTANT
Payer: COMMERCIAL

## 2023-04-14 VITALS
OXYGEN SATURATION: 97 % | HEIGHT: 69 IN | SYSTOLIC BLOOD PRESSURE: 122 MMHG | BODY MASS INDEX: 29.33 KG/M2 | HEART RATE: 60 BPM | WEIGHT: 198 LBS | DIASTOLIC BLOOD PRESSURE: 68 MMHG | TEMPERATURE: 97.7 F

## 2023-04-14 DIAGNOSIS — R09.81 NASAL CONGESTION: ICD-10-CM

## 2023-04-14 DIAGNOSIS — J30.89 PERENNIAL ALLERGIC RHINITIS: Primary | ICD-10-CM

## 2023-04-14 DIAGNOSIS — R06.00 DYSPNEA, UNSPECIFIED TYPE: ICD-10-CM

## 2023-04-14 DIAGNOSIS — T78.1XXA POLLEN-FOOD ALLERGY, INITIAL ENCOUNTER: ICD-10-CM

## 2023-04-14 PROCEDURE — 99214 OFFICE O/P EST MOD 30 MIN: CPT | Performed by: PHYSICIAN ASSISTANT

## 2023-04-14 PROCEDURE — 95117 IMMUNOTHERAPY INJECTIONS: CPT

## 2023-04-14 RX ORDER — FLUTICASONE PROPIONATE AND SALMETEROL 250; 50 UG/1; UG/1
1 POWDER RESPIRATORY (INHALATION) EVERY 12 HOURS
Qty: 60 EACH | Refills: 3 | Status: SHIPPED | OUTPATIENT
Start: 2023-04-14

## 2023-04-14 RX ORDER — ALBUTEROL SULFATE 90 UG/1
2 AEROSOL, METERED RESPIRATORY (INHALATION) EVERY 6 HOURS PRN
Qty: 18 G | Refills: 1 | Status: SHIPPED | OUTPATIENT
Start: 2023-04-14

## 2023-04-14 ASSESSMENT — PAIN SCALES - GENERAL: PAINLEVEL: NO PAIN (0)

## 2023-04-14 NOTE — PROGRESS NOTES
Prior to injection verified pt identity using pt name and date of birth.    Allergy injection/s given and charted on paper allergy flow sheet.  Patient left AMA, signing form, not staying for the observation period.    Clyde Rock RN on 4/14/2023 at 8:41 AM

## 2023-04-14 NOTE — LETTER
4/14/2023         RE: Shane Rai  [address] [facility] [address]        Dear Colleague,    Thank you for referring your patient, Shane Rai, to the [facility]. Please see a copy of my visit note below.    Chief Complaint   Patient presents with     Allergies     Scit follow up       Patient present to ENT for follow-up of allergy immunotherapy.     Patient was last seen in ENT on 5/6/2022 for routine allergy immunotherapy follow-up.     Today, Shane has been doing well with his allergies and injections. He has been doing fairly well and has felt some intermittent w/ exertion. He feels like his breathing is labored. He feels intermittently not being able to complete a full breath even while just sitting. He feels restricted while breathing.   PFT was completed and was normal.    He feels symptoms mostly occur in the AM.   He reports there was a Thyroid US completed- US revealed     He has felt his allergy symptoms have been doing fairly well  Tolerating injections without concerns.     Completed stress test in 2015 and was within normal range.   Completed cardiologist eval and was stable visit.     Shane has rinsed before in the past and felt that some would remain present.   He has been using Flonase and had some nasal burning. He has since felt the sensation with some relief. He has tried Nasacort in past.   He was started on BID AH, but was fatigued. He is on Claritin daily. Energy has been better   He has felt like an itch in his lungs. He felt like a tongue/ sensation.   [employer] food panel was overall negative he did have low-grade positives to egg white and milk.     MQT- 3/19/21  Dilution #6- Birch  Dilution #5-Grass, oak, mold, cat, dog, dust  Dilution #2- weeds, maple, elm, cottonwood, molds.      Food Panel completed- Pending.   No history of perennial allergic rhinitis in 2012 he underwent skin testing with Dr. Tomas that showed high sensitivity to tree grass cocklebur  Pt has call bell in reach, non slip socks on, and bedrails up x2. Pt has BSC and has been instructed to call for assistance. Pt encouraged to wash hands. Pt on 1L 02 nasal cannula. Stool sample needed. Pt is aware.    "mugwort mild dust  He had covid in mid December and still notes hyoposmia and occasional shortness of breath   He notes occasion choking with oral intake     Apples, cherries, almonds all based on prior testing.  No hx of anaphylaxis with food intake  With green apples he had oral swelling of the lips, no anaphylaxis  Juanita cherries caused lip swelling, no other symptoms  No problems with almond intake     He has been on dexilant for GERD which is generally good control.       Past Medical History:   Diagnosis Date     Gastro-oesophageal reflux disease      Hypothyroidism         Allergies   Allergen Reactions     Other Food Allergy Other (See Comments)     Almonds, cherries, apples and walnuts. Apples and cherries cause lip swelling.     Seasonal Allergies      Birch trees, oak trees, grass pollen, mugwart     Zyrtec [Cetirizine] Other (See Comments)     HOT BURNING feeling over entire body.     Current Outpatient Medications   Medication     albuterol (PROAIR HFA/PROVENTIL HFA/VENTOLIN HFA) 108 (90 Base) MCG/ACT inhaler     budesonide (PULMICORT) 0.5 MG/2ML neb solution     clobetasol (TEMOVATE) 0.05 % ointment     desonide (DESOWEN) 0.05 % external cream     dexlansoprazole (DEXILANT) 30 MG CPDR CR capsule     EPINEPHrine (ANY BX GENERIC EQUIV) 0.3 MG/0.3ML injection 2-pack     fluticasone (FLONASE) 50 MCG/ACT nasal spray     LEVOTHYROXINE SODIUM PO     mometasone (NASONEX) 50 MCG/ACT nasal spray     montelukast (SINGULAIR) 10 MG tablet     ORDER FOR ALLERGEN IMMUNOTHERAPY     No current facility-administered medications for this visit.     ROS- SEE HPI  /68 (BP Location: Left arm, Patient Position: Sitting, Cuff Size: Adult Large)   Pulse 60   Temp 97.7  F (36.5  C) (Tympanic)   Ht 1.753 m (5' 9\")   Wt 89.8 kg (198 lb)   SpO2 97%   BMI 29.24 kg/m      Head and Face - Normocephalic and atraumatic, with no gross asymmetry noted.  The facial nerve is intact, with strong symmetric movements.  Voice and " Breathing - The patient was breathing comfortably without the use of accessory muscles. There was no wheezing, stridor, or stertor.  The patients voice was clear and strong, and had appropriate pitch and quality.  No trevor peripheral digital clubbing or cyanosis   Ears -The external auditory canals are patent, the tympanic membranes are intact without effusion, retraction or mass.  Bony landmarks are intact.  Eyes - Extraocular movements intact, and the pupils were reactive to light.  Sclera were not icteric or injected, conjunctiva were pink and moist.  Mouth - Examination of the oral cavity showed pink, healthy oral mucosa. No lesions or ulcerations noted.  The tongue was mobile and midline, and the dentition were in good condition.    Throat - The walls of the oropharynx were smooth, pink, moist, symmetric, and had no lesions or ulcerations.  The tonsillar pillars and soft palate were symmetric.  The uvula was midline on elevation.  Ball Tongue Position III, grade 1 tonsils   Neck - No palpable enlarged fixed cervical lymph nodes.  No neck cysts or unusual tenderness to palpation.   No palpable fixed thyroid nodules or concerning goiter.  The trachea is grossly midline.   Nose - External contour is symmetric, no gross deflection or scars.  Nasal mucosa is pink and moist with no abnormal mucus.  The septum and turbinates were evaluated: mild TH, septal button in place.  No polyps, masses, or purulence noted on examination.   Heart- Regular rate  Lungs- Clear A/p        ASSESSMENT/ PLAN:    ICD-10-CM    1. Perennial allergic rhinitis  J30.89 albuterol (PROAIR HFA/PROVENTIL HFA/VENTOLIN HFA) 108 (90 Base) MCG/ACT inhaler     fluticasone-salmeterol (ADVAIR) 250-50 MCG/ACT inhaler      2. Dyspnea, unspecified type  R06.00 albuterol (PROAIR HFA/PROVENTIL HFA/VENTOLIN HFA) 108 (90 Base) MCG/ACT inhaler     fluticasone-salmeterol (ADVAIR) 250-50 MCG/ACT inhaler      3. Nasal congestion  R09.81       4. oral allergy  syndrome  T78.1XXA         Continue with daily antihistamine and Singulair.  Use budesonide rinse as needed.  Continue with weekly allergy injections  Advance to every other week injections in August 2023.     Start Advair 1 puff twice a day  Use albuterol as needed.     Release of record form thyroid US.   Continue with daily anithistminea  Use Nasonex as needed      Soda/Salt Oral Rinse     1/4 tsp. Baking Soda   1/8 tsp. Salt   1 cup Warm Water     Mix well until salt dissolves. Rinse your mouth gently 2-3 time per day, being careful not to swallow. After, rinse with plain water.     Krupa Fowler PA-C  ENT  [facility], [place]            Again, thank you for allowing me to participate in the care of your patient.        Sincerely,        Krupa Fowler PA-C

## 2023-04-14 NOTE — PATIENT INSTRUCTIONS
Continue with weekly allergy injections  Advance to every other week injections in August 2023.     Start Advair 1 puff twice a day  Use albuterol as needed.     Release of record form thyroid US.   Continue with daily anithistminea  Use Nasonex as needed      Soda/Salt Oral Rinse     1/4 tsp. Baking Soda   1/8 tsp. Salt   1 cup Warm Water     Mix well until salt dissolves. Rinse your mouth gently 2-3 time per day, being careful not to swallow. After, rinse with plain water.       Thank you for allowing Krupa Fowler PA-C and our ENT team to participate in your care.  If your medications are too expensive, please give the nurse a call.  We can possibly change this medication.  If you have a scheduling or an appointment question please contact our Health Unit Coordinator at [phone], Ext. [phone].    ALL nursing questions or concerns can be directed to your ENT nurse at: [phone] Antoinette

## 2023-04-28 ENCOUNTER — ALLIED HEALTH/NURSE VISIT (OUTPATIENT)
Dept: ALLERGY | Facility: OTHER | Age: 53
End: 2023-04-28
Attending: PHYSICIAN ASSISTANT
Payer: COMMERCIAL

## 2023-04-28 DIAGNOSIS — J30.89 PERENNIAL ALLERGIC RHINITIS: Primary | ICD-10-CM

## 2023-04-28 PROCEDURE — 95117 IMMUNOTHERAPY INJECTIONS: CPT

## 2023-04-28 NOTE — LETTER
April 28, 2023      Shane Rai  [address] [facility] [address]        To Whom It May Concern:    Shane Rai was seen in our clinic.       Sincerely,  Lucila LOGAN RN      [facility] RO[facility]

## 2023-04-28 NOTE — PROGRESS NOTES
Prior to injection verified pt identity using pt name and date of birth.    Allergy injection/s given and charted on paper allergy flow sheet.  Patient left AMA, signing form, not staying for the observation period.    Lucila Rai RN on 4/28/2023 at 10:48 AM

## 2023-05-05 ENCOUNTER — ALLIED HEALTH/NURSE VISIT (OUTPATIENT)
Dept: ALLERGY | Facility: OTHER | Age: 53
End: 2023-05-05
Attending: PHYSICIAN ASSISTANT
Payer: COMMERCIAL

## 2023-05-05 DIAGNOSIS — J30.89 PERENNIAL ALLERGIC RHINITIS: Primary | ICD-10-CM

## 2023-05-05 PROCEDURE — 95117 IMMUNOTHERAPY INJECTIONS: CPT

## 2023-05-05 NOTE — PROGRESS NOTES
Prior to injection verified pt identity using pt name and date of birth.    Allergy injection/s given and charted on paper allergy flow sheet.  Patient left AMA, signing form, not staying for the observation period.    Clyde Rock RN on 5/5/2023 at 4:21 PM

## 2023-05-11 ENCOUNTER — ALLIED HEALTH/NURSE VISIT (OUTPATIENT)
Dept: ALLERGY | Facility: OTHER | Age: 53
End: 2023-05-11
Attending: PHYSICIAN ASSISTANT
Payer: COMMERCIAL

## 2023-05-11 DIAGNOSIS — J30.89 PERENNIAL ALLERGIC RHINITIS: Primary | ICD-10-CM

## 2023-05-11 PROCEDURE — 95117 IMMUNOTHERAPY INJECTIONS: CPT

## 2023-05-11 NOTE — PROGRESS NOTES
Prior to injection verified pt identity using pt name and date of birth.    Allergy injection/s given and charted on paper allergy flow sheet.  Patient left AMA, signing form, not staying for the observation period.    Clyde Rock RN on 5/11/2023 at 4:14 PM

## 2023-05-19 ENCOUNTER — ALLIED HEALTH/NURSE VISIT (OUTPATIENT)
Dept: ALLERGY | Facility: OTHER | Age: 53
End: 2023-05-19
Attending: PHYSICIAN ASSISTANT
Payer: COMMERCIAL

## 2023-05-19 DIAGNOSIS — G47.33 OSA (OBSTRUCTIVE SLEEP APNEA): Primary | ICD-10-CM

## 2023-05-19 DIAGNOSIS — J30.89 PERENNIAL ALLERGIC RHINITIS: Primary | ICD-10-CM

## 2023-05-19 PROCEDURE — 95117 IMMUNOTHERAPY INJECTIONS: CPT

## 2023-05-19 NOTE — PROGRESS NOTES
Prior to injection verified pt identity using pt name and date of birth.    Allergy injection/s given and charted on paper allergy flow sheet.  Patient left AMA, signing form, not staying for the observation period.    Cylde Rock RN on 5/19/2023 at 10:53 AM

## 2023-06-02 ENCOUNTER — ALLIED HEALTH/NURSE VISIT (OUTPATIENT)
Dept: ALLERGY | Facility: OTHER | Age: 53
End: 2023-06-02
Attending: PHYSICIAN ASSISTANT
Payer: COMMERCIAL

## 2023-06-02 DIAGNOSIS — J30.89 PERENNIAL ALLERGIC RHINITIS: Primary | ICD-10-CM

## 2023-06-02 PROCEDURE — 95117 IMMUNOTHERAPY INJECTIONS: CPT

## 2023-06-02 NOTE — PROGRESS NOTES
Prior to injection verified pt identity using pt name and date of birth.    Allergy injection/s given and charted on paper allergy flow sheet.  Patient left AMA, signing form, not staying for the observation period.    Lucila Rai RN on 6/2/2023 at 10:57 AM

## 2023-06-08 ENCOUNTER — ALLIED HEALTH/NURSE VISIT (OUTPATIENT)
Dept: ALLERGY | Facility: OTHER | Age: 53
End: 2023-06-08
Attending: PHYSICIAN ASSISTANT
Payer: COMMERCIAL

## 2023-06-08 DIAGNOSIS — J30.89 PERENNIAL ALLERGIC RHINITIS: Primary | ICD-10-CM

## 2023-06-08 PROCEDURE — 95115 IMMUNOTHERAPY ONE INJECTION: CPT

## 2023-06-08 NOTE — PROGRESS NOTES
Allergy injection/s given and charted on paper allergy flow sheet.  Patient experienced no reaction.

## 2023-06-26 ENCOUNTER — ALLIED HEALTH/NURSE VISIT (OUTPATIENT)
Dept: ALLERGY | Facility: OTHER | Age: 53
End: 2023-06-26
Attending: PHYSICIAN ASSISTANT
Payer: COMMERCIAL

## 2023-06-26 DIAGNOSIS — J30.9 ALLERGIC RHINITIS: Primary | ICD-10-CM

## 2023-06-26 PROCEDURE — 95117 IMMUNOTHERAPY INJECTIONS: CPT

## 2023-06-26 NOTE — PROGRESS NOTES
Allergy injection/s given and charted on paper allergy flow sheet.  Patient experienced unknown reaction.  Due to injection administration, patient instructed to remain in clinic for 15 minutes  afterwards, and to report any adverse reaction to me immediately.  Antoinette Perez LPN

## 2023-07-07 ENCOUNTER — ALLIED HEALTH/NURSE VISIT (OUTPATIENT)
Dept: ALLERGY | Facility: OTHER | Age: 53
End: 2023-07-07
Attending: PHYSICIAN ASSISTANT
Payer: COMMERCIAL

## 2023-07-07 DIAGNOSIS — J30.89 PERENNIAL ALLERGIC RHINITIS: Primary | ICD-10-CM

## 2023-07-07 PROCEDURE — 95165 ANTIGEN THERAPY SERVICES: CPT | Performed by: PHYSICIAN ASSISTANT

## 2023-07-07 NOTE — PROGRESS NOTES
Allergy serum is mixed today at [facility] Red Maintenance,  into  2  (5 ml)  multi dose vial/vials.    Allergens included were:    Ragweed  0.2 ml of dilution # 1  Pigweed  0.2 ml of dilution # 1  Mugwort 0.2 ml of dilution # 0  Kochia  0.2 ml of dilution # 0  Russian Thistle 0.2 ml of dilution # 1  Romel Grass 0.2 ml of dilution # 1  Birch mix 0.2 ml of dilution # 1  Maple Mix 0.2 of dilution # 1  Elm Mix 0.2 ml of dilution # 1  Oak Mix 0.2 ml of dilution # 1  Chele Mix 0.2 ml of dilution # 0  Pine Mix 0.2 ml of dilution # 0  Eastern Berthoud 0.2 ml of dilution # 1  Black [place] 0.2 ml of dilution # 0  Aspen 0.2 ml of dilution # 0  Red [place] 0.2 ml of dilution # 0    Alternaria 0.2 ml of dilution # 1  Aspergillus 0.2 ml of dilution # 1  Hormodendrum 0.2 ml of dilution # 1  Helminthosporium 0.2 ml of dilution # 1  Penicillium 0.2 ml of dilution # 1  Epicoccum 0.2 ml of dilution # 1  Fusarium 0.2 ml of dilution # 0  Mucor 0.2 ml of dilution # 0  Grain Smut 0.2 ml of dilution # 0  Grass Smut 0.2 ml of dilution # 0  Cat 0.2 ml of dilution # 1  Dog 0.2 ml of dilution # 1  Feather Mix 0.2 ml of dilution # 0  Dust Mite Mix 0.2 ml of dilution # 1  Horse 0.2 ml of dilution # 0    Lucila Rai RN on 7/7/2023 at 4:55 PM

## 2023-07-19 ENCOUNTER — ALLIED HEALTH/NURSE VISIT (OUTPATIENT)
Dept: ALLERGY | Facility: OTHER | Age: 53
End: 2023-07-19
Attending: PHYSICIAN ASSISTANT
Payer: COMMERCIAL

## 2023-07-19 DIAGNOSIS — J30.89 PERENNIAL ALLERGIC RHINITIS: Primary | ICD-10-CM

## 2023-07-19 PROCEDURE — 95117 IMMUNOTHERAPY INJECTIONS: CPT

## 2023-07-19 NOTE — PROGRESS NOTES
Safety Vial test (SVT) was completed from two red maintenance vials.  He waited the 10 minutes.SVT result on left arm was 7 mm=Passed and the SVT on the right arm was 9 mm=Passed.  He was then administered his allergy shots.  Allergy injection/s given and charted on paper allergy flow sheet.  Patient experienced no reaction.  Due to injection administration, patient instructed to remain in clinic for 15 minutes  afterwards, and to report any adverse reaction to me immediately. After allergy injection patient christina out AMA.  Clinic Administered Medication Documentation      Injectable Medication Documentation    Is there an active order (written within the past 365 days, with administrations remaining, not ) in the chart? Yes.     Patient was given 0.5 mls was given in the right upper/back of arm from his red maintenance vial.  0.5 mls was given in the left upper/back arm from his red maintenance vial. . Prior to medication administration, verified patient's identity using patient s name and date of birth. Please see MAR and medication order for additional information. Patient instructed to remain in clinic for 15 minutes, report any adverse reaction to staff immediately and Signed out AMA.    Vial/Syringe: Multi dose vial  Was this medication supplied by the patient? No  Is this a medication the patient will need to receive again? Yes. Verified that the patient has refills remaining in their prescription.

## 2023-07-26 ENCOUNTER — ALLIED HEALTH/NURSE VISIT (OUTPATIENT)
Dept: ALLERGY | Facility: OTHER | Age: 53
End: 2023-07-26
Attending: PHYSICIAN ASSISTANT
Payer: COMMERCIAL

## 2023-07-26 DIAGNOSIS — J30.9 ALLERGIC RHINITIS: Primary | ICD-10-CM

## 2023-07-26 PROCEDURE — 95117 IMMUNOTHERAPY INJECTIONS: CPT

## 2023-07-26 NOTE — PROGRESS NOTES
Allergy injection/s given and charted on paper allergy flow sheet.  Patient experienced unknown reaction.  Due to injection administration, patient instructed to remain in clinic for 15 minutes  afterwards, and to report any adverse reaction to me immediately.  Pt signed out AMA 1635.  ISIAH EPSTEIN LPN

## 2023-08-02 ENCOUNTER — ALLIED HEALTH/NURSE VISIT (OUTPATIENT)
Dept: ALLERGY | Facility: OTHER | Age: 53
End: 2023-08-02
Attending: PHYSICIAN ASSISTANT
Payer: COMMERCIAL

## 2023-08-02 DIAGNOSIS — J30.9 ALLERGIC RHINITIS: Primary | ICD-10-CM

## 2023-08-02 PROCEDURE — 95117 IMMUNOTHERAPY INJECTIONS: CPT

## 2023-08-02 NOTE — PROGRESS NOTES
Allergy injection/s given and charted on paper allergy flow sheet.  Patient experienced unknown reaction, patient signed out AMA form at 1635.    Due to injection administration, patient instructed to remain in clinic for 30 minutes  afterwards, and to report any adverse reaction to me immediately. Patient signed out AMA form at 1635.    Clinic Administered Medication Documentation      Injectable Medication Documentation    Is there an active order (written within the past 365 days, with administrations remaining, not ) in the chart? Yes.     Patient was given  allergy injection of 0.5 ml from red vial 1 into left arm and allergy injection of 0.5 ml from red vial 2 given in the right arm . Prior to medication administration, verified patient's identity using patient s name and date of birth. Please see MAR and medication order for additional information. Patient instructed to remain in clinic for 15 minutes, report any adverse reaction to staff immediately, remain in clinic for 15 minutes and report any adverse reaction to staff immediately but patient declined, and patient signed out AMA form at 1635, instructed to seek medical attention/help if having any symptoms of a reaction or if he needed to use his Epipen, patient aware and acknowledged .    Vial/Syringe: Single dose vial. Was entire vial of medication used? Yes  Was this medication supplied by the patient? No  Is this a medication the patient will need to receive again? Yes. Verified that the patient has refills remaining in their prescription.    Kelsie Francis RN

## 2023-08-09 ENCOUNTER — ALLIED HEALTH/NURSE VISIT (OUTPATIENT)
Dept: ALLERGY | Facility: OTHER | Age: 53
End: 2023-08-09
Attending: PHYSICIAN ASSISTANT
Payer: COMMERCIAL

## 2023-08-09 DIAGNOSIS — J30.9 ALLERGIC RHINITIS: Primary | ICD-10-CM

## 2023-08-09 PROCEDURE — 95117 IMMUNOTHERAPY INJECTIONS: CPT

## 2023-08-09 NOTE — PROGRESS NOTES
Allergy injection/s given and charted on paper allergy flow sheet.  Patient experienced unknown reaction. Patient signed out AMA form at 1640.    Due to injection administration, patient instructed to remain in clinic for 30 minutes  afterwards, and to report any adverse reaction to me immediately. Patient signed out AMA form at 1640.    Clinic Administered Medication Documentation      Injectable Medication Documentation    Is there an active order (written within the past 365 days, with administrations remaining, not ) in the chart? Yes.     Patient was given  allergy injection of 0.5 ml from red vial 1 given in the left arm and allergy injection of 0.5 ml from red vial 2 and given in the right arm . Prior to medication administration, verified patient's identity using patient s name and date of birth. Please see MAR and medication order for additional information. Patient instructed to remain in clinic for 15 minutes, report any adverse reaction to staff immediately, remain in clinic for 15 minutes and report any adverse reaction to staff immediately but patient declined, and patient signed out AMA form at 1640 .    Vial/Syringe: Single dose vial. Was entire vial of medication used? Yes  Was this medication supplied by the patient? No  Is this a medication the patient will need to receive again? Yes. Verified that the patient has refills remaining in their prescription.      Kelsie Francis RN

## 2023-08-21 ENCOUNTER — ALLIED HEALTH/NURSE VISIT (OUTPATIENT)
Dept: ALLERGY | Facility: OTHER | Age: 53
End: 2023-08-21
Attending: PHYSICIAN ASSISTANT
Payer: COMMERCIAL

## 2023-08-21 DIAGNOSIS — J30.9 ALLERGIC RHINITIS: Primary | ICD-10-CM

## 2023-08-21 PROCEDURE — 95117 IMMUNOTHERAPY INJECTIONS: CPT

## 2023-08-21 NOTE — PROGRESS NOTES
Allergy injection/s given and charted on paper allergy flow sheet.  Patient experienced unknown reaction.  Due to injection administration, patient instructed to remain in clinic for 15 minutes  afterwards, and to report any adverse reaction to me immediately.  Pt signed out AMA 1630.  ISIAH EPSTEIN LPN

## 2023-08-28 ENCOUNTER — ALLIED HEALTH/NURSE VISIT (OUTPATIENT)
Dept: ALLERGY | Facility: OTHER | Age: 53
End: 2023-08-28
Attending: PHYSICIAN ASSISTANT
Payer: COMMERCIAL

## 2023-08-28 DIAGNOSIS — J30.9 ALLERGIC RHINITIS: Primary | ICD-10-CM

## 2023-08-28 PROCEDURE — 95117 IMMUNOTHERAPY INJECTIONS: CPT

## 2023-08-28 NOTE — PROGRESS NOTES
Allergy injection/s given and charted on paper allergy flow sheet.  Patient experienced unknown reaction.  Due to injection administration, patient instructed to remain in clinic for 15 minutes  afterwards, and to report any adverse reaction to me immediately.  Pt signed out AMA 1620.  ISIAH EPSTEIN LPN

## 2023-09-01 ENCOUNTER — HOSPITAL ENCOUNTER (EMERGENCY)
Facility: HOSPITAL | Age: 53
Discharge: HOME | End: 2023-09-01
Attending: STUDENT IN AN ORGANIZED HEALTH CARE EDUCATION/TRAINING PROGRAM | Admitting: STUDENT IN AN ORGANIZED HEALTH CARE EDUCATION/TRAINING PROGRAM
Payer: COMMERCIAL

## 2023-09-01 ENCOUNTER — TELEPHONE (OUTPATIENT)
Dept: OTOLARYNGOLOGY | Facility: OTHER | Age: 53
End: 2023-09-01

## 2023-09-01 ENCOUNTER — APPOINTMENT (OUTPATIENT)
Dept: CT IMAGING | Facility: HOSPITAL | Age: 53
End: 2023-09-01
Attending: STUDENT IN AN ORGANIZED HEALTH CARE EDUCATION/TRAINING PROGRAM
Payer: COMMERCIAL

## 2023-09-01 VITALS
WEIGHT: 200 LBS | OXYGEN SATURATION: 97 % | HEART RATE: 68 BPM | DIASTOLIC BLOOD PRESSURE: 101 MMHG | RESPIRATION RATE: 16 BRPM | BODY MASS INDEX: 29.53 KG/M2 | SYSTOLIC BLOOD PRESSURE: 153 MMHG | TEMPERATURE: 97.9 F

## 2023-09-01 DIAGNOSIS — K57.92 DIVERTICULITIS: ICD-10-CM

## 2023-09-01 DIAGNOSIS — E04.1 THYROID NODULE: Primary | ICD-10-CM

## 2023-09-01 DIAGNOSIS — R10.32 ABDOMINAL PAIN, LEFT LOWER QUADRANT: ICD-10-CM

## 2023-09-01 LAB
ALBUMIN SERPL BCG-MCNC: 4.8 G/DL (ref 3.5–5.2)
ALBUMIN UR-MCNC: NEGATIVE MG/DL
ALP SERPL-CCNC: 71 U/L (ref 40–129)
ALT SERPL W P-5'-P-CCNC: 49 U/L (ref 0–70)
ANION GAP SERPL CALCULATED.3IONS-SCNC: 13 MMOL/L (ref 7–15)
APPEARANCE UR: CLEAR
AST SERPL W P-5'-P-CCNC: 31 U/L (ref 0–45)
BASOPHILS # BLD AUTO: 0.1 10E3/UL (ref 0–0.2)
BASOPHILS NFR BLD AUTO: 1 %
BILIRUB DIRECT SERPL-MCNC: <0.2 MG/DL (ref 0–0.3)
BILIRUB SERPL-MCNC: 0.5 MG/DL
BILIRUB UR QL STRIP: NEGATIVE
BUN SERPL-MCNC: 9.8 MG/DL (ref 6–20)
CALCIUM SERPL-MCNC: 9.6 MG/DL (ref 8.6–10)
CHLORIDE SERPL-SCNC: 103 MMOL/L (ref 98–107)
COLOR UR AUTO: NORMAL
CREAT SERPL-MCNC: 0.9 MG/DL (ref 0.67–1.17)
DEPRECATED HCO3 PLAS-SCNC: 24 MMOL/L (ref 22–29)
EOSINOPHIL # BLD AUTO: 0.3 10E3/UL (ref 0–0.7)
EOSINOPHIL NFR BLD AUTO: 5 %
ERYTHROCYTE [DISTWIDTH] IN BLOOD BY AUTOMATED COUNT: 12.8 % (ref 10–15)
GFR SERPL CREATININE-BSD FRML MDRD: >90 ML/MIN/1.73M2
GLUCOSE SERPL-MCNC: 101 MG/DL (ref 70–99)
GLUCOSE UR STRIP-MCNC: NEGATIVE MG/DL
HCT VFR BLD AUTO: 47.6 % (ref 40–53)
HGB BLD-MCNC: 16.2 G/DL (ref 13.3–17.7)
HGB UR QL STRIP: NEGATIVE
IMM GRANULOCYTES # BLD: 0 10E3/UL
IMM GRANULOCYTES NFR BLD: 0 %
KETONES UR STRIP-MCNC: NEGATIVE MG/DL
LEUKOCYTE ESTERASE UR QL STRIP: NEGATIVE
LIPASE SERPL-CCNC: 22 U/L (ref 13–60)
LYMPHOCYTES # BLD AUTO: 1.5 10E3/UL (ref 0.8–5.3)
LYMPHOCYTES NFR BLD AUTO: 24 %
MCH RBC QN AUTO: 29.8 PG (ref 26.5–33)
MCHC RBC AUTO-ENTMCNC: 34 G/DL (ref 31.5–36.5)
MCV RBC AUTO: 88 FL (ref 78–100)
MONOCYTES # BLD AUTO: 0.5 10E3/UL (ref 0–1.3)
MONOCYTES NFR BLD AUTO: 7 %
NEUTROPHILS # BLD AUTO: 4 10E3/UL (ref 1.6–8.3)
NEUTROPHILS NFR BLD AUTO: 63 %
NITRATE UR QL: NEGATIVE
NRBC # BLD AUTO: 0 10E3/UL
NRBC BLD AUTO-RTO: 0 /100
PH UR STRIP: 6.5 [PH] (ref 4.7–8)
PLATELET # BLD AUTO: 265 10E3/UL (ref 150–450)
POTASSIUM SERPL-SCNC: 4.2 MMOL/L (ref 3.4–5.3)
PROT SERPL-MCNC: 7.2 G/DL (ref 6.4–8.3)
RBC # BLD AUTO: 5.44 10E6/UL (ref 4.4–5.9)
RBC URINE: <1 /HPF
SODIUM SERPL-SCNC: 140 MMOL/L (ref 136–145)
SP GR UR STRIP: 1.01 (ref 1–1.03)
SQUAMOUS EPITHELIAL: 0 /HPF
UROBILINOGEN UR STRIP-MCNC: NORMAL MG/DL
WBC # BLD AUTO: 6.4 10E3/UL (ref 4–11)
WBC URINE: <1 /HPF

## 2023-09-01 PROCEDURE — 250N000011 HC RX IP 250 OP 636: Performed by: RADIOLOGY

## 2023-09-01 PROCEDURE — 83690 ASSAY OF LIPASE: CPT | Performed by: STUDENT IN AN ORGANIZED HEALTH CARE EDUCATION/TRAINING PROGRAM

## 2023-09-01 PROCEDURE — 74177 CT ABD & PELVIS W/CONTRAST: CPT

## 2023-09-01 PROCEDURE — 82248 BILIRUBIN DIRECT: CPT | Performed by: STUDENT IN AN ORGANIZED HEALTH CARE EDUCATION/TRAINING PROGRAM

## 2023-09-01 PROCEDURE — 99285 EMERGENCY DEPT VISIT HI MDM: CPT | Mod: 25

## 2023-09-01 PROCEDURE — 99284 EMERGENCY DEPT VISIT MOD MDM: CPT | Performed by: STUDENT IN AN ORGANIZED HEALTH CARE EDUCATION/TRAINING PROGRAM

## 2023-09-01 PROCEDURE — 82310 ASSAY OF CALCIUM: CPT | Performed by: STUDENT IN AN ORGANIZED HEALTH CARE EDUCATION/TRAINING PROGRAM

## 2023-09-01 PROCEDURE — 36415 COLL VENOUS BLD VENIPUNCTURE: CPT | Performed by: STUDENT IN AN ORGANIZED HEALTH CARE EDUCATION/TRAINING PROGRAM

## 2023-09-01 PROCEDURE — 81001 URINALYSIS AUTO W/SCOPE: CPT | Performed by: STUDENT IN AN ORGANIZED HEALTH CARE EDUCATION/TRAINING PROGRAM

## 2023-09-01 PROCEDURE — 85025 COMPLETE CBC W/AUTO DIFF WBC: CPT | Performed by: STUDENT IN AN ORGANIZED HEALTH CARE EDUCATION/TRAINING PROGRAM

## 2023-09-01 RX ORDER — LEVOTHYROXINE SODIUM 125 UG/1
125 TABLET ORAL DAILY
COMMUNITY
Start: 2023-08-04

## 2023-09-01 RX ORDER — KETOROLAC TROMETHAMINE 15 MG/ML
15 INJECTION, SOLUTION INTRAMUSCULAR; INTRAVENOUS ONCE
Status: COMPLETED | OUTPATIENT
Start: 2023-09-01 | End: 2023-09-01

## 2023-09-01 RX ORDER — IOPAMIDOL 755 MG/ML
98 INJECTION, SOLUTION INTRAVASCULAR ONCE
Status: COMPLETED | OUTPATIENT
Start: 2023-09-01 | End: 2023-09-01

## 2023-09-01 RX ADMIN — IOPAMIDOL 98 ML: 755 INJECTION, SOLUTION INTRAVENOUS at 09:48

## 2023-09-01 ASSESSMENT — ACTIVITIES OF DAILY LIVING (ADL): ADLS_ACUITY_SCORE: 35

## 2023-09-01 NOTE — ED NOTES
"Patient ambulated to ED Rm 5 w/ c/o ABD discomfort since 08/30/23 after he ate some [employer] pizza for lunch at work.  Rates pain a 2-4 with activity, zero/10 @ rest. A&Ox4.  Does not recall a recent trauma or injury.  Denies N/V/D, fever, chills, SOB, chest pain.   LLQ tenderness with palpation.   BS active throughout all quadrants, last BM was this morning, only had coffee this morning for breakfast.   Colonoscopy a year or so ago and he reports they found \"those little pockets, maybe diverticulosis?\"  "

## 2023-09-01 NOTE — ED NOTES
"Patient reports that he feels like he does not \"empty out\" after having a BM or urinating.   Has a \"full or heavy feeling\" in his ABD.     Also reports \"about a month ago he was doing something and felt a \"pop\" in his left side\".    "

## 2023-09-01 NOTE — ED PROVIDER NOTES
History     Chief Complaint   Patient presents with    Abdominal Pain     HPI  Shane Rai is a 53 year old male who Presents to the ER with concern of abdominal pain.  Patient states that he had discomfort therefore approximately 48 hours.  States last night the pain had him get up from bed to use the bathroom and now he is feeling somewhat heavy.  Patient states he also feels like his abdomen is tender.  No fevers, headache, chest pain, shortness of breath.  No changes to bowel or bladder.    Allergies:  Allergies   Allergen Reactions    Other Food Allergy Other (See Comments)     Almonds, cherries, apples and walnuts. Apples and cherries cause lip swelling.    Seasonal Allergies      Birch trees, oak trees, grass pollen, mugwart    Zyrtec [Cetirizine] Other (See Comments)     HOT BURNING feeling over entire body.       Problem List:    Patient Active Problem List    Diagnosis Date Noted    Herpes zoster complication 06/08/2013     Priority: Medium    Backache 08/27/2012     Priority: Medium     IMO Update      Hypothyroidism 06/29/2012     Priority: Medium    Allergic rhinitis 01/05/2011     Priority: Medium     IMO Update      Adjustment disorder with depressed mood 09/08/2008     Priority: Medium     More approapriate diagnosis is major depression due to long term hx of depression.      Generalized anxiety disorder 09/08/2008     Priority: Medium    Major depressive disorder, recurrent episode, mild (H) 09/08/2008     Priority: Medium     IMO Update 10/11      Esophageal reflux 03/22/2007     Priority: Medium    Abdominal pain, epigastric 02/27/2007     Priority: Medium    Anxiety state 04/26/2006     Priority: Medium     IMO Update 10/11          Past Medical History:    Past Medical History:   Diagnosis Date    Gastro-oesophageal reflux disease     Hypothyroidism        Past Surgical History:    Past Surgical History:   Procedure Laterality Date    deviated septum[      ESOPHAGOSCOPY, GASTROSCOPY,  DUODENOSCOPY (EGD), COMBINED  2014    Procedure: COMBINED ESOPHAGOSCOPY, GASTROSCOPY, DUODENOSCOPY (EGD);  UPPER ENDOSCOPY WITH BIOPSY;  Surgeon: Dima Peraza MD;  Location: [facility]    VASECTOMY         Family History:    History reviewed. No pertinent family history.    Social History:  Marital Status:   [2]  Social History     Tobacco Use    Smoking status: Former     Types: Cigarettes     Quit date: 2010     Years since quittin.6    Smokeless tobacco: Never   Substance Use Topics    Alcohol use: No    Drug use: Never        Medications:    amoxicillin-clavulanate (AUGMENTIN) 875-125 MG tablet  levothyroxine (SYNTHROID/LEVOTHROID) 125 MCG tablet  albuterol (PROAIR HFA/PROVENTIL HFA/VENTOLIN HFA) 108 (90 Base) MCG/ACT inhaler  albuterol (PROAIR HFA/PROVENTIL HFA/VENTOLIN HFA) 108 (90 Base) MCG/ACT inhaler  budesonide (PULMICORT) 0.5 MG/2ML neb solution  clobetasol (TEMOVATE) 0.05 % ointment  desonide (DESOWEN) 0.05 % external cream  dexlansoprazole (DEXILANT) 30 MG CPDR CR capsule  EPINEPHrine (ANY BX GENERIC EQUIV) 0.3 MG/0.3ML injection 2-pack  fluticasone (FLONASE) 50 MCG/ACT nasal spray  fluticasone-salmeterol (ADVAIR) 250-50 MCG/ACT inhaler  LEVOTHYROXINE SODIUM PO  mometasone (NASONEX) 50 MCG/ACT nasal spray  montelukast (SINGULAIR) 10 MG tablet  ORDER FOR ALLERGEN IMMUNOTHERAPY          Review of Systems  SEE HPI  Physical Exam   BP: 165/99  Pulse: 73  Temp: 97.9  F (36.6  C)  Resp: 18  Weight: 90.7 kg (200 lb)  SpO2: 96 %      Physical Exam  Constitutional: Alert and conversant. NAD   HENT: Atraumatic  Eyes: Normal pupils   Neck: Normal ROM  CV: Normal rate, regular rhythm, no murmur   Pulmonary/Chest: Non-labored respirations, clear to auscultation bilaterally   Abdominal: Soft, non-distended. Mild to moderate LLQ abdominal tenderness.  MSK: KELLOGG.   Neuro: Alert and appropriate. Cns, Strength, and Sensation grossly intact  Skin: Warm and dry. No diaphoresis. No rashes on exposed skin     Psych: Appropriate mood and affect     ED Course   Impression and Plan: Patient presents with concern of abdominal pain.  Vitals reviewed, unconcerning.  Patient with exam which she is overall will appearing.  However, with him having some sensation of abdominal fullness as well as some rather appreciable tenderness in the left lower quadrant our plan at this time will be to further evaluate the patient with abdominal labs, urinalysis, and a CT scan of his abdomen.  Patient will be closely monitored here in the emergency department.  Symptoms will be treated with Toradol.  Final plan pending results and ED course.           ED Course as of 09/01/23 1033   Fri Sep 01, 2023   0929 Cbc wnl.    0929 UA wnl.    0941 Lipase wnl. LFTs wnl.    0941 BMP without any concerning findings.    1031 IMPRESSION: The above findings are compatible with mild or early  diverticulitis. There is no evidence of abscess or free air at this  time.     1033 Patient found to have early diverticulitis on CT.  As such, I felt that the patient was safe for discharge.  Will discharge on a course of Augmentin.  Return precautions were discussed including any severe worsening of symptoms that brought the patient here today.  All questions were answered, and patient was discharged in good condition.     Procedures             Results for orders placed or performed during the hospital encounter of 09/01/23 (from the past 24 hour(s))   CBC with platelets differential    Narrative    The following orders were created for panel order CBC with platelets differential.  Procedure                               Abnormality         Status                     ---------                               -----------         ------                     CBC with platelets and d...[[id number]]                      Final result                 Please view results for these tests on the individual orders.   Basic metabolic panel   Result Value Ref [place]    Sodium 140  136 - 145 mmol/L    Potassium 4.2 3.4 - 5.3 mmol/L    Chloride 103 98 - 107 mmol/L    Carbon Dioxide (CO2) 24 22 - 29 mmol/L    Anion Gap 13 7 - 15 mmol/L    Urea Nitrogen 9.8 6.0 - 20.0 mg/dL    Creatinine 0.90 0.67 - 1.17 mg/dL    Calcium 9.6 8.6 - 10.0 mg/dL    Glucose 101 (H) 70 - 99 mg/dL    GFR Estimate >90 >60 mL/min/1.73m2   Lipase   Result Value Ref [place]    Lipase 22 13 - 60 U/L   Hepatic function panel   Result Value Ref [place]    Protein Total 7.2 6.4 - 8.3 g/dL    Albumin 4.8 3.5 - 5.2 g/dL    Bilirubin Total 0.5 <=1.2 mg/dL    Alkaline Phosphatase 71 40 - 129 U/L    AST 31 0 - 45 U/L    ALT 49 0 - 70 U/L    Bilirubin Direct <0.20 0.00 - 0.30 mg/dL   CBC with platelets and differential   Result Value Ref [place]    WBC Count 6.4 4.0 - 11.0 10e3/uL    RBC Count 5.44 4.40 - 5.90 10e6/uL    Hemoglobin 16.2 13.3 - 17.7 g/dL    Hematocrit 47.6 40.0 - 53.0 %    MCV 88 78 - 100 fL    MCH 29.8 26.5 - 33.0 pg    MCHC 34.0 31.5 - 36.5 g/dL    RDW 12.8 10.0 - 15.0 %    Platelet Count 265 150 - 450 10e3/uL    % Neutrophils 63 %    % Lymphocytes 24 %    % Monocytes 7 %    % Eosinophils 5 %    % Basophils 1 %    % Immature Granulocytes 0 %    NRBCs per 100 WBC 0 <1 /100    Absolute Neutrophils 4.0 1.6 - 8.3 10e3/uL    Absolute Lymphocytes 1.5 0.8 - 5.3 10e3/uL    Absolute Monocytes 0.5 0.0 - 1.3 10e3/uL    Absolute Eosinophils 0.3 0.0 - 0.7 10e3/uL    Absolute Basophils 0.1 0.0 - 0.2 10e3/uL    Absolute Immature Granulocytes 0.0 <=0.4 10e3/uL    Absolute NRBCs 0.0 10e3/uL   UA with Microscopic reflex to Culture    Specimen: Urine, Clean Catch   Result Value Ref [place]    Color Urine Light Yellow Colorless, Straw, Light Yellow, Yellow    Appearance Urine Clear Clear    Glucose Urine Negative Negative mg/dL    Bilirubin Urine Negative Negative    Ketones Urine Negative Negative mg/dL    Specific Gravity Urine 1.006 1.003 - 1.035    Blood Urine Negative Negative    pH Urine 6.5 4.7 - 8.0    Protein Albumin Urine Negative  Negative mg/dL    Urobilinogen Urine Normal Normal, 2.0 mg/dL    Nitrite Urine Negative Negative    Leukocyte Esterase Urine Negative Negative    RBC Urine <1 <=2 /HPF    WBC Urine <1 <=5 /HPF    Squamous Epithelials Urine 0 <=1 /HPF    Narrative    Urine Culture not indicated   CT Abdomen Pelvis w Contrast    Narrative    Exam:CT ABDOMEN PELVIS W CONTRAST    History: 53 years Male left lower quadrant abdominal pain.     Comparisons:    Technique: Axial CT imaging of the abdomen and pelvis was performed  contrast. Coronal and sagittal reconstructions were obtained.   This exam was performed using one or more of the following dose  reduction techniques:  Automated exposure control, adjustment of the JARVIS and/or KV according  to patient's size, and/or use of iterative reconstruction technique.       FINDINGS:  Lung bases:The lung bases are clear    Abdomen:  Liver:Unremarkable  Gallbladder and biliary tree:No calcified gallstones are present.  There is no evidence of biliary dilatation.  Pancreas:Unremarkable  Spleen:Unremarkable  Adrenals:Normal    Kidneys and ureters:Unremarkable    Lymph nodes:There is no significant lymphadenopathy    Bowel: There is diverticulosis of the sigmoid colon. There is subtle  fat stranding around the sigmoid colon in the left lower quadrant, see  series 3 image 156. No organized fluid collections are present to  suggest abscess. There is no free air.    Appendix:Unremarkable    Vessels:Unremarkable    Osseous structures:Unremarkable    Pelvis:There is no evidence of mass or lymphadenopathy. No abnormal  fluid collections are present.            Impression    IMPRESSION: The above findings are compatible with mild or early  diverticulitis. There is no evidence of abscess or free air at this  time.    MARY WOODRUFF MD         SYSTEM ID:  [id number]       Medications   ketorolac (TORADOL) injection 15 mg (has no administration in time range)   sodium chloride (PF) 0.9% PF flush 50 mL (50  mLs Intravenous $Given 9/1/23 0948)   iopamidol (ISOVUE-370) solution 98 mL (98 mLs Intravenous $Given 9/1/23 0948)       Assessments & Plan (with Medical Decision Making)     I have reviewed the nursing notes.          New Prescriptions    AMOXICILLIN-CLAVULANATE (AUGMENTIN) 875-125 MG TABLET    Take 1 tablet by mouth 3 times daily for 7 days       Final diagnoses:   Abdominal pain, left lower quadrant   Diverticulitis       9/1/2023   [facility]       Delvis Barth MD  09/01/23 1039

## 2023-09-01 NOTE — DISCHARGE INSTRUCTIONS
What to expect when you have contrast    During your exam, we will inject  contrast  into your vein or artery. (Contrast is a clear liquid with iodine in it. It shows up on X-rays.)    You may feel warm or hot. You may have a metal taste in your mouth and a slight upset stomach. You may also feel pressure near the kidneys and bladder. These effects will last about 1 to 3 minutes.    Please tell us if you have:   Sneezing    Itching   Hives    Swelling in the face   A hoarse voice   Breathing problems   Other new symptoms    Serious problems are rare.  They may include:   Irregular heartbeat    Seizures   Kidney failure             Tissue damage   Shock     Death    If you have any problems during the exam, we  will treat them right away.    When you get home    Call your hospital if you have any new symptoms in the next 2 days, like hives or swelling. (Phone numbers are at the bottom of this page.) Or call your family doctor.     If you have wheezing or trouble breathing, call 911.    Self-care  -Drink at least 4 extra glasses of water today.   This reduces the stress on your kidneys.  -Keep taking your regular medicines.    The contrast will pass out of your body in your  Urine(pee). This will happen in the next 24 hours. You  will not feel this. Your urine will not  change color.    If you have kidney problems or take metformin    Drink 4 to 8 large glasses of water for the next  2 days, if you are not on a fluid restriction.    ?If you take metformin (Glucophage or Glucovance) for diabetes, keep taking it.      ?Your kidney function tests are abnormal.  If you take Metformin, do not take it for 48 hours. Please go to your clinic for a blood test within 3 days after your exam before the restarting this medicine.     (Note to provider:please give patient prescription for lab tests.)    ?Special instructions: -    I have read and understand the above information.    Patient Sign  Here:______________________________________Date:________Time:______    Staff Sign Here:________________________________________Date:_______Time:______      Radiology Departments:     ?[facility]: [phone] ?[place]: [phone]     ?[place]: [phone] ?[facility]:[phone]      ?[place]: [phone]  ?[place]: [phone]  ?[place]:[phone]    ?[facility] [place]:[phone]  ?[facility]:[phone]    Your evaluated today for concern of abdominal pain.  Our evaluation is most consistent with diverticulitis.  Please continue to monitor your symptoms closely and take your antibiotics as prescribed.  If your symptoms get significantly worse, please return.

## 2023-09-01 NOTE — ED TRIAGE NOTES
Patient presents with complaints of some lower left abdominal discomfort since Wed. States now last night he had to get up to use the bathroom and that it just is a heavy feeling. He states it's also tender to the touch.

## 2023-09-05 ENCOUNTER — TELEPHONE (OUTPATIENT)
Dept: EMERGENCY MEDICINE | Facility: HOSPITAL | Age: 53
End: 2023-09-05

## 2023-09-08 ENCOUNTER — HOSPITAL ENCOUNTER (OUTPATIENT)
Dept: ULTRASOUND IMAGING | Facility: HOSPITAL | Age: 53
Discharge: HOME | End: 2023-09-08
Attending: PHYSICIAN ASSISTANT | Admitting: PHYSICIAN ASSISTANT
Payer: COMMERCIAL

## 2023-09-08 DIAGNOSIS — E04.1 THYROID NODULE: ICD-10-CM

## 2023-09-08 PROCEDURE — 76536 US EXAM OF HEAD AND NECK: CPT

## 2023-09-13 ENCOUNTER — ALLIED HEALTH/NURSE VISIT (OUTPATIENT)
Dept: ALLERGY | Facility: OTHER | Age: 53
End: 2023-09-13
Attending: PHYSICIAN ASSISTANT
Payer: COMMERCIAL

## 2023-09-13 DIAGNOSIS — J30.9 ALLERGIC RHINITIS: Primary | ICD-10-CM

## 2023-09-13 PROCEDURE — 95117 IMMUNOTHERAPY INJECTIONS: CPT

## 2023-10-09 ENCOUNTER — ALLIED HEALTH/NURSE VISIT (OUTPATIENT)
Dept: ALLERGY | Facility: OTHER | Age: 53
End: 2023-10-09
Attending: PHYSICIAN ASSISTANT
Payer: COMMERCIAL

## 2023-10-09 DIAGNOSIS — J30.9 ALLERGIC RHINITIS: Primary | ICD-10-CM

## 2023-10-09 PROCEDURE — 95117 IMMUNOTHERAPY INJECTIONS: CPT

## 2023-10-09 NOTE — PROGRESS NOTES
Allergy injection/s given and charted on paper allergy flow sheet.  Patient experienced unknown reaction.  Due to injection administration, patient instructed to remain in clinic for 15 minutes  afterwards, and to report any adverse reaction to me immediately.  Pt signed out AMA 1615.  ISIAH EPSTEIN LPN

## 2023-10-23 ENCOUNTER — ALLIED HEALTH/NURSE VISIT (OUTPATIENT)
Dept: ALLERGY | Facility: OTHER | Age: 53
End: 2023-10-23
Attending: PHYSICIAN ASSISTANT
Payer: COMMERCIAL

## 2023-10-23 DIAGNOSIS — J30.9 ALLERGIC RHINITIS: Primary | ICD-10-CM

## 2023-10-23 PROCEDURE — 95117 IMMUNOTHERAPY INJECTIONS: CPT

## 2023-10-23 NOTE — PROGRESS NOTES
Allergy injection/s given and charted on paper allergy flow sheet.  Patient experienced unknown reaction.  Due to injection administration, patient instructed to remain in clinic for 15 minutes  afterwards, and to report any adverse reaction to me immediately.  Pt signed out AMA 1612.  ISIAH EPSTEIN LPN

## 2023-10-25 ENCOUNTER — ALLIED HEALTH/NURSE VISIT (OUTPATIENT)
Dept: ALLERGY | Facility: OTHER | Age: 53
End: 2023-10-25
Attending: PHYSICIAN ASSISTANT
Payer: COMMERCIAL

## 2023-10-25 DIAGNOSIS — J30.89 PERENNIAL ALLERGIC RHINITIS: Primary | ICD-10-CM

## 2023-10-25 PROCEDURE — 95165 ANTIGEN THERAPY SERVICES: CPT | Performed by: PHYSICIAN ASSISTANT

## 2023-10-25 NOTE — PROGRESS NOTES
Allergy serum is mixed on 10/23/2023 at 1530 by Krupa Fowler PA-C and Tyra Cortes NP at schedule [facility] of maintenance,  into  2  (5 ml)  multi dose vial/vials.    Allergens included were:    Ragweed  0.2 ml of dilution # 1  Pigweed  0.2 ml of dilution # 1  Mugwort 0.2 ml of dilution # 0  Kochia  0.2 ml of dilution # 0  Russian Thistle 0.2 ml of dilution # 1  Romel Grass 0.2 ml of dilution # 1  Birch mix 0.2 ml of dilution # 1  Maple Mix 0.2 of dilution # 1  Elm Mix 0.2 ml of dilution # 1  Oak Mix 0.2 ml of dilution # 1  Chele Mix 0.2 ml of dilution # 0  Pine Mix 0.2 ml of dilution # 0  Eastern [place] 0.2 ml of dilution # 1  Black [place] 0.2 ml of dilution # 0  Aspen 0.2 ml of dilution # 0  Red [place] 0.2 ml of dilution # 0    Alternaria 0.2 ml of dilution # 1  Aspergillus 0.2 ml of dilution # 1  Hormodendrum 0.2 ml of dilution # 1  Helminthosporium 0.2 ml of dilution # 1  Penicillium 0.2 ml of dilution # 1  Epicoccum 0.2 ml of dilution # 1  Fusarium 0.2 ml of dilution # 0  Mucor 0.2 ml of dilution # 0  Grain Smut 0.2 ml of dilution # 0  Grass Smut 0.2 ml of dilution # 0  Cat 0.2 ml of dilution # 1  Dog 0.2 ml of dilution # 1  Feather Mix 0.2 ml of dilution # 0  Dust Mite Mix 0.2 ml of dilution # 1  Horse 0.2 ml of dilution # 0

## 2023-11-06 ENCOUNTER — ALLIED HEALTH/NURSE VISIT (OUTPATIENT)
Dept: ALLERGY | Facility: OTHER | Age: 53
End: 2023-11-06
Payer: COMMERCIAL

## 2023-11-06 DIAGNOSIS — J30.9 ALLERGIC RHINITIS: Primary | ICD-10-CM

## 2023-11-06 PROCEDURE — 95117 IMMUNOTHERAPY INJECTIONS: CPT

## 2023-11-06 NOTE — PROGRESS NOTES
Prior to injection patient identity verified using name and date of birth.     SVT done on left arm, measuring 9 MM. Passed Red vial 1.     SVT done on right arm, measuring 10 MM. Passed Red vial 2.    Documented on paper flow sheet.     Allergy injection given and charted on paper allergy flow sheet. Patient signed out AMA at 1635.    Allergy injection/s given and charted on paper allergy flow sheet.  Patient experienced a unknown reaction. Patient signed out AMA at 1635.    Due to injection administration, patient instructed to remain in clinic for 30 minutes  afterwards, and to report any adverse reaction to me immediately.    Patient was instructed to seek medical attention/go to the emergency room if having any reaction symptoms or needing to use their Epipen, patient aware, alert and orientated, and acknowledged. Patient signed out AMA form at 1635.

## 2023-11-20 ENCOUNTER — ALLIED HEALTH/NURSE VISIT (OUTPATIENT)
Dept: ALLERGY | Facility: OTHER | Age: 53
End: 2023-11-20
Payer: COMMERCIAL

## 2023-11-20 DIAGNOSIS — J30.9 ALLERGIC RHINITIS: ICD-10-CM

## 2023-11-20 DIAGNOSIS — J30.89 PERENNIAL ALLERGIC RHINITIS: Primary | ICD-10-CM

## 2023-11-20 PROCEDURE — 95117 IMMUNOTHERAPY INJECTIONS: CPT

## 2023-11-20 NOTE — PROGRESS NOTES
Prior to injection verified patient identity using patient name and date of birth.    Questions asked: Yes    Patient was given allergy injection(s) of 0.5 mL from Red vial given in Both arm(s).    Injection(s) charted on paper allergy flow sheet.    Patient left against medical advice (AMA), signed form and did not stay for the observation period.    Patient was instructed to seek medical attention/go to the emergency room if having any reaction symptoms or needing to use their Epipen, patient aware and acknowledged. Patient signed out AMA form at 1625. Patient ambulated out of clinic independently.

## 2023-12-04 ENCOUNTER — ALLIED HEALTH/NURSE VISIT (OUTPATIENT)
Dept: ALLERGY | Facility: OTHER | Age: 53
End: 2023-12-04
Payer: COMMERCIAL

## 2023-12-04 DIAGNOSIS — J30.9 ALLERGIC RHINITIS: Primary | ICD-10-CM

## 2023-12-04 PROCEDURE — 95117 IMMUNOTHERAPY INJECTIONS: CPT

## 2023-12-04 NOTE — PROGRESS NOTES
Prior to injection verified patient identity using patient name and date of birth.    Questions asked: Yes    Patient was given allergy injection(s) of 0.5 mL from Red vial given in Both arm(s).    Injection(s) charted on paper allergy flow sheet.    Patient left against medical advice (AMA), signed form and did not stay for the observation period.    Patient was instructed to seek medical attention/go to the emergency room if having any reaction symptoms or needing to use their Epipen, patient aware and acknowledged. Patient signed out AMA form at 1624 and ambulated out of clinic.

## 2023-12-18 ENCOUNTER — ALLIED HEALTH/NURSE VISIT (OUTPATIENT)
Dept: ALLERGY | Facility: OTHER | Age: 53
End: 2023-12-18
Payer: COMMERCIAL

## 2023-12-18 DIAGNOSIS — J30.9 ALLERGIC RHINITIS: Primary | ICD-10-CM

## 2023-12-18 PROCEDURE — 95117 IMMUNOTHERAPY INJECTIONS: CPT

## 2023-12-18 NOTE — PROGRESS NOTES
Prior to injection verified patient identity using patient name and date of birth.    Questions asked: Yes    Patient was given allergy injection(s) of 0.5 mL from Red vial given in Both arm(s).    Injection(s) charted on paper allergy flow sheet.    Patient left against medical advice (AMA), signed form and did not stay for the observation period.1615.  ISIAH EPSTEIN LPN

## 2024-01-08 ENCOUNTER — ALLIED HEALTH/NURSE VISIT (OUTPATIENT)
Dept: ALLERGY | Facility: OTHER | Age: 54
End: 2024-01-08
Attending: PHYSICIAN ASSISTANT
Payer: COMMERCIAL

## 2024-01-08 DIAGNOSIS — J30.9 ALLERGIC RHINITIS: ICD-10-CM

## 2024-01-08 DIAGNOSIS — J30.89 PERENNIAL ALLERGIC RHINITIS: Primary | ICD-10-CM

## 2024-01-08 PROCEDURE — 95117 IMMUNOTHERAPY INJECTIONS: CPT

## 2024-01-08 NOTE — PROGRESS NOTES
Prior to injection verified patient identity using patient name and date of birth.    Questions asked: Yes    Patient was given allergy injection(s) of 0.5 mL from Red vial given in Both arm(s).    Injection(s) charted on paper allergy flow sheet.    Patient left against medical advice (AMA), signed form and did not stay for the observation period. Patient ambulated out of the clinic unit independently.     Patient was instructed to seek medical attention/go to the emergency room if having any reaction symptoms or needing to use their Epipen, patient aware and acknowledged. Patient signed out AMA form at 1610.

## 2024-01-22 ENCOUNTER — ALLIED HEALTH/NURSE VISIT (OUTPATIENT)
Dept: ALLERGY | Facility: OTHER | Age: 54
End: 2024-01-22
Attending: PHYSICIAN ASSISTANT
Payer: COMMERCIAL

## 2024-01-22 DIAGNOSIS — J30.9 ALLERGIC RHINITIS: Primary | ICD-10-CM

## 2024-01-22 PROCEDURE — 95117 IMMUNOTHERAPY INJECTIONS: CPT

## 2024-01-22 NOTE — PROGRESS NOTES
Prior to injection verified patient identity using patient name and date of birth.    Questions asked: Yes    Patient was given allergy injection(s) of 0.5 mL from Red vial given in Both arm(s).    Injection(s) charted on paper allergy flow sheet.    Epipen present at appointment.    Patient left against medical advice (AMA), signed form and did not stay for the observation period.1610.  ISIAH EPSTEIN LPN

## 2024-01-26 ENCOUNTER — ALLIED HEALTH/NURSE VISIT (OUTPATIENT)
Dept: ALLERGY | Facility: OTHER | Age: 54
End: 2024-01-26
Attending: PHYSICIAN ASSISTANT
Payer: COMMERCIAL

## 2024-01-26 DIAGNOSIS — J30.89 PERENNIAL ALLERGIC RHINITIS: Primary | ICD-10-CM

## 2024-01-26 PROCEDURE — 95165 ANTIGEN THERAPY SERVICES: CPT | Performed by: PHYSICIAN ASSISTANT

## 2024-01-26 NOTE — PROGRESS NOTES
Allergy serum is mixed today at [facility],  into  2  (5 ml)  multi dose vial/vials.    Allergens included were:    Ragweed  0.2 ml of dilution # 1  Pigweed  0.2 ml of dilution # 1  Mugwort 0.2 ml of dilution # 0  Kochia  0.2 ml of dilution # 0  Russian Thistle 0.2 ml of dilution # 1  Romel Grass 0.2 ml of dilution # 1  Birch mix 0.2 ml of dilution # 1  Maple Mix 0.2 of dilution # 1  Elm Mix 0.2 ml of dilution # 1  Oak Mix 0.2 ml of dilution # 1  Chele Mix 0.2 ml of dilution # 0  Pine Mix 0.2 ml of dilution # 0  Eastern [place] 0.2 ml of dilution # 1  Black [place] 0.2 ml of dilution # 0  Aspen 0.2 ml of dilution # 0  Red [place] 0.2 ml of dilution # 0    Alternaria 0.2 ml of dilution # 1  Aspergillus 0.2 ml of dilution # 1  Hormodendrum 0.2 ml of dilution # 1  Helminthosporium 0.2 ml of dilution # 1  Penicillium 0.2 ml of dilution # 1  Epicoccum 0.2 ml of dilution # 1  Fusarium 0.2 ml of dilution # 0  Mucor 0.2 ml of dilution # 0  Grain Smut 0.2 ml of dilution # 0  Grass Smut 0.2 ml of dilution # 0  Cat 0.2 ml of dilution # 1  Dog 0.2 ml of dilution # 1  Feather Mix 0.2 ml of dilution # 0  Dust Mite Mix 0.2 ml of dilution # 1  Horse 0.2 ml of dilution # 0

## 2024-02-05 ENCOUNTER — ALLIED HEALTH/NURSE VISIT (OUTPATIENT)
Dept: ALLERGY | Facility: OTHER | Age: 54
End: 2024-02-05
Attending: PHYSICIAN ASSISTANT
Payer: COMMERCIAL

## 2024-02-05 DIAGNOSIS — J30.9 ALLERGIC RHINITIS: ICD-10-CM

## 2024-02-05 DIAGNOSIS — J30.89 PERENNIAL ALLERGIC RHINITIS: Primary | ICD-10-CM

## 2024-02-05 PROCEDURE — 95117 IMMUNOTHERAPY INJECTIONS: CPT

## 2024-02-05 NOTE — PROGRESS NOTES
Prior to injection verified patient identity using patient name and date of birth.    Questions asked: Yes    Patient was given allergy injection(s) of 0.5 mL from Red vial given in Both arm(s).    Injection(s) charted on paper allergy flow sheet.    Epipen present at appointment.    Patient left against medical advice (AMA), signed form and did not stay for the observation period.  Patient was instructed to seek medical attention/go to the emergency room if having any reaction symptoms or needing to use their Epipen, patient aware and acknowledged. Patient signed out AMA form at 1608 and ambulated out of the clinic.

## 2024-02-17 ENCOUNTER — HEALTH MAINTENANCE LETTER (OUTPATIENT)
Age: 54
End: 2024-02-17

## 2024-02-19 ENCOUNTER — ALLIED HEALTH/NURSE VISIT (OUTPATIENT)
Dept: ALLERGY | Facility: OTHER | Age: 54
End: 2024-02-19
Attending: PHYSICIAN ASSISTANT
Payer: COMMERCIAL

## 2024-02-19 DIAGNOSIS — J30.89 PERENNIAL ALLERGIC RHINITIS: Primary | ICD-10-CM

## 2024-02-19 DIAGNOSIS — J30.9 ALLERGIC RHINITIS: ICD-10-CM

## 2024-02-19 PROCEDURE — 95117 IMMUNOTHERAPY INJECTIONS: CPT

## 2024-02-19 NOTE — PROGRESS NOTES
Prior to injection verified patient identity using patient name and date of birth.    Questions asked: Yes    Patient was given allergy injection(s) of 0.5 mL from Red vial given in Both arm(s).    Injection(s) charted on paper allergy flow sheet.    Epipen present at appointment.    Patient left against medical advice (AMA), signed form and did not stay for the observation period.  Patient was instructed to seek medical attention/go to the emergency room if having any reaction symptoms or needing to use their Epipen, patient aware and acknowledged. Patient signed out AMA form at 1610 and ambulated out of the clinic.

## 2024-03-04 ENCOUNTER — ALLIED HEALTH/NURSE VISIT (OUTPATIENT)
Dept: ALLERGY | Facility: OTHER | Age: 54
End: 2024-03-04
Attending: PHYSICIAN ASSISTANT
Payer: COMMERCIAL

## 2024-03-04 DIAGNOSIS — J30.9 ALLERGIC RHINITIS: ICD-10-CM

## 2024-03-04 DIAGNOSIS — J30.89 PERENNIAL ALLERGIC RHINITIS: Primary | ICD-10-CM

## 2024-03-04 PROCEDURE — 95117 IMMUNOTHERAPY INJECTIONS: CPT

## 2024-03-04 NOTE — PROGRESS NOTES
Prior to injection verified patient identity using patient name and date of birth.    Questions asked: Yes    Patient was given allergy injection(s) of 0.5 mL from Red vial given in Both arm(s).    Injection(s) charted on paper allergy flow sheet.    Epipen present at appointment.    Patient left against medical advice (AMA), signed form and did not stay for the observation period.  Patient was instructed to seek medical attention/go to the emergency room if having any reaction symptoms or needing to use their Epipen, patient aware and acknowledged. Patient signed out AMA form at 1622 and ambulated out of the clinic.

## 2024-03-09 NOTE — TELEPHONE ENCOUNTER
Bedside report given to Hoang DOLAN   Patient called 2/23/22 to cancel a allergy shot because he wasn't able to make the time. We rescheduled one of his appointments but is wondering how many days in between he is able to go without a shot. Patient would like a call back so he doesn't go without if he doesn't reschedule..          [phone]        Pooja Michaels

## 2024-03-18 ENCOUNTER — ALLIED HEALTH/NURSE VISIT (OUTPATIENT)
Dept: ALLERGY | Facility: OTHER | Age: 54
End: 2024-03-18
Attending: NURSE PRACTITIONER
Payer: COMMERCIAL

## 2024-03-18 DIAGNOSIS — J30.9 ALLERGIC RHINITIS: Primary | ICD-10-CM

## 2024-03-18 PROCEDURE — 95117 IMMUNOTHERAPY INJECTIONS: CPT

## 2024-03-18 NOTE — PROGRESS NOTES
Prior to injection patient identity verified using name and date of birth.     SVT done on both arms, measuring 6 MM on the left and 7 MM on the right. Passed.     Documented on paper flow sheet.     Allergy injection given and charted on paper allergy flow sheet. Patient signed out AMA.     Prior to injection verified patient identity using patient name and date of birth.    Questions asked: Yes    Patient was given allergy injection(s) of 0.5 mL from Red vial given in Both arm(s).    Injection(s) charted on paper allergy flow sheet.    Epipen present at appointment.    Patient left against medical advice (AMA), signed form and did not stay for the observation period.  Patient was instructed to seek medical attention/go to the emergency room if having any reaction symptoms or needing to use their Epipen, patient aware and acknowledged. Patient signed out AMA form at 1632 and ambulated out of the clinic.

## 2024-03-26 DIAGNOSIS — R06.00 DYSPNEA, UNSPECIFIED TYPE: ICD-10-CM

## 2024-03-26 DIAGNOSIS — J30.89 PERENNIAL ALLERGIC RHINITIS: ICD-10-CM

## 2024-03-26 NOTE — TELEPHONE ENCOUNTER
montelukast (SINGULAIR) 10 MG tablet     Last Written Prescription Date:  9-25-23  Last Fill Quantity: 90,   # refills: 1  Last Office Visit: 4-14-23  Future Office visit:       Routing refill request to provider for review/approval because:

## 2024-03-27 RX ORDER — MONTELUKAST SODIUM 10 MG/1
1 TABLET ORAL AT BEDTIME
Qty: 90 TABLET | Refills: 0 | Status: SHIPPED | OUTPATIENT
Start: 2024-03-27 | End: 2024-07-02

## 2024-03-28 DIAGNOSIS — J30.89 PERENNIAL ALLERGIC RHINITIS: Primary | ICD-10-CM

## 2024-04-01 ENCOUNTER — ALLIED HEALTH/NURSE VISIT (OUTPATIENT)
Dept: ALLERGY | Facility: OTHER | Age: 54
End: 2024-04-01
Attending: NURSE PRACTITIONER
Payer: COMMERCIAL

## 2024-04-01 DIAGNOSIS — J30.89 PERENNIAL ALLERGIC RHINITIS: Primary | ICD-10-CM

## 2024-04-01 DIAGNOSIS — J30.9 ALLERGIC RHINITIS: ICD-10-CM

## 2024-04-01 PROCEDURE — 95117 IMMUNOTHERAPY INJECTIONS: CPT

## 2024-04-01 NOTE — PROGRESS NOTES
Prior to injection verified patient identity using patient name and date of birth.    Questions asked: Yes    Patient was given allergy injection(s) of 0.5 mL from Red vial given in Both arm(s).    Injection(s) charted on paper allergy flow sheet.    Epipen present at appointment.    Patient left against medical advice (AMA), signed form and did not stay for the observation period.  Patient was instructed to seek medical attention/go to the emergency room if having any reaction symptoms or needing to use their Epipen, patient aware and acknowledged. Patient signed out AMA form at 1615 and ambulated out of the clinic.

## 2024-04-15 ENCOUNTER — ALLIED HEALTH/NURSE VISIT (OUTPATIENT)
Dept: ALLERGY | Facility: OTHER | Age: 54
End: 2024-04-15
Attending: NURSE PRACTITIONER
Payer: COMMERCIAL

## 2024-04-15 DIAGNOSIS — J30.9 ALLERGIC RHINITIS: ICD-10-CM

## 2024-04-15 DIAGNOSIS — J30.89 PERENNIAL ALLERGIC RHINITIS: Primary | ICD-10-CM

## 2024-04-15 PROCEDURE — 95117 IMMUNOTHERAPY INJECTIONS: CPT

## 2024-04-15 NOTE — PROGRESS NOTES
Prior to injection verified patient identity using patient name and date of birth.    Questions asked: Yes    Patient was given allergy injection(s) of 0.5 mL from Red vial given in Both arm(s).    Injection(s) charted on paper allergy flow sheet.    Epipen present at appointment.    Patient left against medical advice (AMA), signed form and did not stay for the observation period.  Patient was instructed to seek medical attention/go to the emergency room if having any reaction symptoms or needing to use their Epipen, patient aware and acknowledged. Patient signed out AMA form at 1630 and ambulated out of the clinic.

## 2024-04-29 ENCOUNTER — ALLIED HEALTH/NURSE VISIT (OUTPATIENT)
Dept: ALLERGY | Facility: OTHER | Age: 54
End: 2024-04-29
Attending: NURSE PRACTITIONER
Payer: COMMERCIAL

## 2024-04-29 DIAGNOSIS — J30.9 ALLERGIC RHINITIS: ICD-10-CM

## 2024-04-29 DIAGNOSIS — J30.89 PERENNIAL ALLERGIC RHINITIS: Primary | ICD-10-CM

## 2024-04-29 PROCEDURE — 95117 IMMUNOTHERAPY INJECTIONS: CPT

## 2024-04-29 NOTE — PROGRESS NOTES
Prior to injection verified patient identity using patient name and date of birth.    Questions asked: Yes    Patient was given allergy injection(s) of 0.5 mL from Red vial given in Both arm(s).    Injection(s) charted on paper allergy flow sheet.    Epipen present at appointment.    Patient left against medical advice (AMA), signed form and did not stay for the observation period.  Patient was instructed to seek medical attention/go to the emergency room if having any reaction symptoms or needing to use their Epipen, patient aware and acknowledged. Patient signed out AMA form at 1625 and ambulated out of the clinic.

## 2024-04-30 DIAGNOSIS — G47.33 OBSTRUCTIVE SLEEP APNEA (ADULT) (PEDIATRIC): Primary | ICD-10-CM

## 2024-05-13 ENCOUNTER — ALLIED HEALTH/NURSE VISIT (OUTPATIENT)
Dept: ALLERGY | Facility: OTHER | Age: 54
End: 2024-05-13
Attending: PHYSICIAN ASSISTANT
Payer: COMMERCIAL

## 2024-05-13 DIAGNOSIS — J30.9 ALLERGIC RHINITIS: ICD-10-CM

## 2024-05-13 DIAGNOSIS — J30.89 PERENNIAL ALLERGIC RHINITIS: Primary | ICD-10-CM

## 2024-05-13 PROCEDURE — 95117 IMMUNOTHERAPY INJECTIONS: CPT

## 2024-06-03 ENCOUNTER — ALLIED HEALTH/NURSE VISIT (OUTPATIENT)
Dept: ALLERGY | Facility: OTHER | Age: 54
End: 2024-06-03
Attending: PHYSICIAN ASSISTANT
Payer: COMMERCIAL

## 2024-06-03 DIAGNOSIS — J30.9 ALLERGIC RHINITIS: Primary | ICD-10-CM

## 2024-06-03 PROCEDURE — 95117 IMMUNOTHERAPY INJECTIONS: CPT

## 2024-06-03 NOTE — PROGRESS NOTES
Prior to injection verified patient identity using patient name and date of birth.    Questions asked: Yes    Patient was given allergy injection(s) of 0.5 mL from Red vial given in Both arm(s).    Injection(s) charted on paper allergy flow sheet.    Epipen present at appointment.    Patient left against medical advice (AMA), signed form and did not stay for the observation period.  Patient was instructed to seek medical attention/go to the emergency room if having any reaction symptoms or needing to use their Epipen, patient aware and acknowledged. Patient signed out AMA form at 1620 and ambulated out of the clinic.

## 2024-06-11 ENCOUNTER — ALLIED HEALTH/NURSE VISIT (OUTPATIENT)
Dept: ALLERGY | Facility: OTHER | Age: 54
End: 2024-06-11
Attending: PHYSICIAN ASSISTANT
Payer: COMMERCIAL

## 2024-06-11 DIAGNOSIS — J30.89 PERENNIAL ALLERGIC RHINITIS: Primary | ICD-10-CM

## 2024-06-11 PROCEDURE — 2894A PR VOIDCORRECT: CPT | Performed by: PHYSICIAN ASSISTANT

## 2024-06-11 NOTE — PROGRESS NOTES
Allergy serum is mixed today at schedule red maintenance,  into  2  (5 ml)  multi dose vial/vials.    Allergens included were:    Ragweed  0.2 ml of dilution # 1  Pigweed  0.2 ml of dilution # 1  Mugwort 0.2 ml of dilution # 0  Kochia  0.2 ml of dilution # 0  Russian Thistle 0.2 ml of dilution # 1  Romel Grass 0.2 ml of dilution # 1  Birch mix 0.2 ml of dilution # 1  Maple Mix 0.2 of dilution # 1  Elm Mix 0.2 ml of dilution # 1  Oak Mix 0.2 ml of dilution # 1  Chele Mix 0.2 ml of dilution # 0  Pine Mix 0.2 ml of dilution # 0  Eastern [place] 0.2 ml of dilution # 1  Black [place] 0.2 ml of dilution # 0  Aspen 0.2 ml of dilution # 0  Red [place] 0.2 ml of dilution # 0    Alternaria 0.2 ml of dilution # 1  Aspergillus 0.2 ml of dilution # 1  Hormodendrum 0.2 ml of dilution # 1  Helminthosporium 0.2 ml of dilution # 1  Penicillium 0.2 ml of dilution # 1  Epicoccum 0.2 ml of dilution # 1  Fusarium 0.2 ml of dilution # 0  Mucor 0.2 ml of dilution # 0  Grain Smut 0.2 ml of dilution # 0  Grass Smut 0.2 ml of dilution # 0  Cat 0.2 ml of dilution # 1  Dog 0.2 ml of dilution # 1  Feather Mix 0.2 ml of dilution # 0  Dust Mite Mix 0.2 ml of dilution # 1  Horse 0.2 ml of dilution # 0

## 2024-06-17 ENCOUNTER — ALLIED HEALTH/NURSE VISIT (OUTPATIENT)
Dept: ALLERGY | Facility: OTHER | Age: 54
End: 2024-06-17
Attending: PHYSICIAN ASSISTANT
Payer: COMMERCIAL

## 2024-06-17 DIAGNOSIS — J30.89 PERENNIAL ALLERGIC RHINITIS: Primary | ICD-10-CM

## 2024-06-17 PROCEDURE — 95117 IMMUNOTHERAPY INJECTIONS: CPT

## 2024-06-17 NOTE — PROGRESS NOTES
Prior to injection verified patient identity using patient name and date of birth.    Questions asked: Yes    Patient was given allergy injection(s) of 0.5 mL from Red vial given in Both arm(s).    Injection(s) charted on paper allergy flow sheet.    Epipen present at appointment.    Patient left against medical advice (AMA), signed form and did not stay for the observation period.  Patient was instructed to seek medical attention/go to the emergency room if having any reaction symptoms or needing to use their Epipen, patient aware and acknowledged. Patient signed out AMA form at 1614 and ambulated out of the clinic.

## 2024-06-19 ENCOUNTER — TELEPHONE (OUTPATIENT)
Dept: ALLERGY | Facility: OTHER | Age: 54
End: 2024-06-19

## 2024-06-19 DIAGNOSIS — J30.89 PERENNIAL ALLERGIC RHINITIS: Primary | ICD-10-CM

## 2024-06-19 RX ORDER — EPINEPHRINE 0.3 MG/.3ML
0.3 INJECTION SUBCUTANEOUS PRN
Qty: 2 EACH | Refills: 1 | Status: SHIPPED | OUTPATIENT
Start: 2024-06-19 | End: 2024-07-15

## 2024-06-29 DIAGNOSIS — J30.89 PERENNIAL ALLERGIC RHINITIS: ICD-10-CM

## 2024-06-29 DIAGNOSIS — R06.00 DYSPNEA, UNSPECIFIED TYPE: ICD-10-CM

## 2024-07-01 NOTE — TELEPHONE ENCOUNTER
Singulair      Last Written Prescription Date:  3.27.24  Last Fill Quantity: #90,   # refills: 0  Last Office Visit: 4.14.23  Future Office visit:       Routing refill request to provider for review/approval because:

## 2024-07-02 RX ORDER — MONTELUKAST SODIUM 10 MG/1
1 TABLET ORAL AT BEDTIME
Qty: 90 TABLET | Refills: 0 | Status: SHIPPED | OUTPATIENT
Start: 2024-07-02 | End: 2024-09-27

## 2024-07-03 ENCOUNTER — ALLIED HEALTH/NURSE VISIT (OUTPATIENT)
Dept: ALLERGY | Facility: OTHER | Age: 54
End: 2024-07-03
Attending: PHYSICIAN ASSISTANT
Payer: COMMERCIAL

## 2024-07-03 DIAGNOSIS — J30.89 PERENNIAL ALLERGIC RHINITIS: Primary | ICD-10-CM

## 2024-07-03 PROCEDURE — 95117 IMMUNOTHERAPY INJECTIONS: CPT

## 2024-07-03 NOTE — PROGRESS NOTES
Prior to injection verified patient identity using patient name and date of birth.    Questions asked: Yes    Patient was given allergy injection(s) of 0.5 mL from Red vial given in Both arm(s).    Injection(s) charted on paper allergy flow sheet.    Epipen present at appointment.    Patient left against medical advice (AMA), signed form and did not stay for the observation period.  Patient was instructed to seek medical attention/go to the emergency room if having any reaction symptoms or needing to use their Epipen, patient aware and acknowledged. Patient signed out AMA form at 0915 and ambulated out of the clinic.

## 2024-07-15 ENCOUNTER — MYC REFILL (OUTPATIENT)
Dept: ALLERGY | Facility: OTHER | Age: 54
End: 2024-07-15

## 2024-07-15 ENCOUNTER — ALLIED HEALTH/NURSE VISIT (OUTPATIENT)
Dept: ALLERGY | Facility: OTHER | Age: 54
End: 2024-07-15
Attending: PHYSICIAN ASSISTANT
Payer: COMMERCIAL

## 2024-07-15 DIAGNOSIS — J30.89 PERENNIAL ALLERGIC RHINITIS: ICD-10-CM

## 2024-07-15 DIAGNOSIS — J30.89 PERENNIAL ALLERGIC RHINITIS: Primary | ICD-10-CM

## 2024-07-15 PROCEDURE — 95117 IMMUNOTHERAPY INJECTIONS: CPT

## 2024-07-15 NOTE — TELEPHONE ENCOUNTER
EpiPen      Last Written Prescription Date:  6/19/24  Last Fill Quantity: 2,   # refills: 1  Last Office Visit: 4/14/23  Future Office visit:       Routing refill request to provider for review/approval because:

## 2024-07-16 RX ORDER — EPINEPHRINE 0.3 MG/.3ML
0.3 INJECTION SUBCUTANEOUS PRN
Qty: 2 EACH | Refills: 1 | Status: SHIPPED | OUTPATIENT
Start: 2024-07-16

## 2024-07-29 ENCOUNTER — ALLIED HEALTH/NURSE VISIT (OUTPATIENT)
Dept: ALLERGY | Facility: OTHER | Age: 54
End: 2024-07-29
Attending: PHYSICIAN ASSISTANT
Payer: COMMERCIAL

## 2024-07-29 DIAGNOSIS — J30.89 PERENNIAL ALLERGIC RHINITIS: Primary | ICD-10-CM

## 2024-07-29 PROCEDURE — 95117 IMMUNOTHERAPY INJECTIONS: CPT

## 2024-07-29 NOTE — PROCEDURES
SVT was completed today.  9 mm wheal formed on bilateral arms. Proceeded to allergy injection.  Prior to injection verified patient identity using patient name and date of birth.    Questions asked: Yes    Patient was given allergy injection(s) of 0.5 mL from Red vial given in Both arm(s).    Injection(s) charted on paper allergy flow sheet.    Epipen present at appointment.    Patient left against medical advice (AMA), signed form and did not stay for the observation period.  Patient was instructed to seek medical attention/go to the emergency room if having any reaction symptoms or needing to use their Epipen, patient aware and acknowledged. Patient signed out AMA form at 4:30 pm and ambulated out of the clinic.

## 2024-08-12 ENCOUNTER — ALLIED HEALTH/NURSE VISIT (OUTPATIENT)
Dept: ALLERGY | Facility: OTHER | Age: 54
End: 2024-08-12
Payer: COMMERCIAL

## 2024-08-12 DIAGNOSIS — J30.89 PERENNIAL ALLERGIC RHINITIS: Primary | ICD-10-CM

## 2024-08-12 PROCEDURE — 95117 IMMUNOTHERAPY INJECTIONS: CPT

## 2024-08-12 NOTE — PROCEDURES
Prior to injection verified patient identity using patient name and date of birth.    Questions asked: Yes    Patient was given allergy injection(s) of 0.5 mL from Red vial given in Both arm(s).    Injection(s) charted on paper allergy flow sheet.    Epipen present at appointment.    Patient left against medical advice (AMA), signed form and did not stay for the observation period.  Patient was instructed to seek medical attention/go to the emergency room if having any reaction symptoms or needing to use their Epipen, patient aware and acknowledged. Patient signed out AMA form at 4:15 pm and ambulated out of the clinic.

## 2024-08-23 ENCOUNTER — ALLIED HEALTH/NURSE VISIT (OUTPATIENT)
Dept: ALLERGY | Facility: OTHER | Age: 54
End: 2024-08-23
Attending: PHYSICIAN ASSISTANT
Payer: COMMERCIAL

## 2024-08-23 DIAGNOSIS — J30.89 PERENNIAL ALLERGIC RHINITIS: Primary | ICD-10-CM

## 2024-08-23 PROCEDURE — 95165 ANTIGEN THERAPY SERVICES: CPT | Performed by: PHYSICIAN ASSISTANT

## 2024-08-23 NOTE — PROGRESS NOTES
Allergy serum is mixed today at schedule red maintenance,  into  2  (5 ml)  multi dose vial/vials.    Allergens included were:    Ragweed  0.2 ml of dilution # 1  Pigweed  0.2 ml of dilution # 1  Mugwort 0.2 ml of dilution # 0  Kochia  0.2 ml of dilution # 0  Russian Thistle 0.2 ml of dilution # 1  Romel Grass 0.2 ml of dilution # 1  Birch mix 0.2 ml of dilution # 1  Maple Mix 0.2 of dilution # 1  Elm Mix 0.2 ml of dilution # 1  Oak Mix 0.2 ml of dilution # 1  Chele Mix 0.2 ml of dilution # 0  Pine Mix 0.2 ml of dilution # 0  Eastern [place] 0.2 ml of dilution # 1  Black [place] 0.2 ml of dilution # 0  Aspen 0.2 ml of dilution # 0  Red [place] 0.2 ml of dilution # 0    Alternaria 0.2 ml of dilution # 1  Aspergillus 0.2 ml of dilution # 1  Hormodendrum 0.2 ml of dilution # 1  Helminthosporium 0.2 ml of dilution # 1  Penicillium 0.2 ml of dilution # 1  Epicoccum 0.2 ml of dilution # 1  Fusarium 0.2 ml of dilution # 0  Mucor 0.2 ml of dilution # 0  Grain Smut 0.2 ml of dilution # 0  Grass Smut 0.2 ml of dilution # 0  Cat 0.2 ml of dilution # 1  Dog 0.2 ml of dilution # 1  Feather Mix 0.2 ml of dilution # 0  Dust Mite Mix 0.2 ml of dilution # 1  Horse 0.2 ml of dilution # 0

## 2024-08-27 ENCOUNTER — ALLIED HEALTH/NURSE VISIT (OUTPATIENT)
Dept: ALLERGY | Facility: OTHER | Age: 54
End: 2024-08-27
Attending: PHYSICIAN ASSISTANT
Payer: COMMERCIAL

## 2024-08-27 DIAGNOSIS — J30.89 PERENNIAL ALLERGIC RHINITIS: Primary | ICD-10-CM

## 2024-08-27 PROCEDURE — 95117 IMMUNOTHERAPY INJECTIONS: CPT

## 2024-08-27 NOTE — PROGRESS NOTES
Prior to injection patient identity verified using name and date of birth.     SVT done on left arm, measuring 7 MM. Passed     SVT done on right arm, measuring 7 MM. Passed     Documented on paper flow sheet.     Allergy injection given and charted on paper allergy flow sheet. Patient signed out AMA. Prior to injection verified patient identity using patient name and date of birth.    Questions asked: Yes    Patient was given allergy injection(s) of 0.5 mL from Red vial given in Both arm(s).    Injection(s) charted on paper allergy flow sheet.    Epipen present at appointment.    Patient left against medical advice (AMA), signed form and did not stay for the observation period.  Patient was instructed to seek medical attention/go to the emergency room if having any reaction symptoms or needing to use their Epipen, patient aware and acknowledged. Patient signed out AMA form at 1630 and ambulated out of the clinic.

## 2024-09-10 ENCOUNTER — ALLIED HEALTH/NURSE VISIT (OUTPATIENT)
Dept: ALLERGY | Facility: OTHER | Age: 54
End: 2024-09-10
Attending: PHYSICIAN ASSISTANT
Payer: COMMERCIAL

## 2024-09-10 DIAGNOSIS — J30.89 PERENNIAL ALLERGIC RHINITIS: Primary | ICD-10-CM

## 2024-09-10 PROCEDURE — 95117 IMMUNOTHERAPY INJECTIONS: CPT

## 2024-09-27 DIAGNOSIS — R06.00 DYSPNEA, UNSPECIFIED TYPE: ICD-10-CM

## 2024-09-27 DIAGNOSIS — J30.89 PERENNIAL ALLERGIC RHINITIS: ICD-10-CM

## 2024-09-27 RX ORDER — MONTELUKAST SODIUM 10 MG/1
1 TABLET ORAL AT BEDTIME
Qty: 90 TABLET | Refills: 0 | Status: SHIPPED | OUTPATIENT
Start: 2024-09-27

## 2024-09-27 NOTE — TELEPHONE ENCOUNTER
Singulair      Last Written Prescription Date:  7/2/24  Last Fill Quantity: 90,   # refills: 0  Last Office Visit: 4/14/23  Future Office visit:       Routing refill request to provider for review/approval because:

## 2024-09-30 ENCOUNTER — ALLIED HEALTH/NURSE VISIT (OUTPATIENT)
Dept: ALLERGY | Facility: OTHER | Age: 54
End: 2024-09-30
Attending: PHYSICIAN ASSISTANT
Payer: COMMERCIAL

## 2024-09-30 DIAGNOSIS — J30.89 PERENNIAL ALLERGIC RHINITIS: Primary | ICD-10-CM

## 2024-09-30 PROCEDURE — 95117 IMMUNOTHERAPY INJECTIONS: CPT

## 2024-10-14 ENCOUNTER — ALLIED HEALTH/NURSE VISIT (OUTPATIENT)
Dept: ALLERGY | Facility: OTHER | Age: 54
End: 2024-10-14
Attending: PHYSICIAN ASSISTANT
Payer: COMMERCIAL

## 2024-10-14 DIAGNOSIS — J30.89 PERENNIAL ALLERGIC RHINITIS: Primary | ICD-10-CM

## 2024-10-14 PROCEDURE — 95117 IMMUNOTHERAPY INJECTIONS: CPT

## 2024-10-20 ENCOUNTER — APPOINTMENT (OUTPATIENT)
Dept: CT IMAGING | Facility: HOSPITAL | Age: 54
End: 2024-10-20
Attending: EMERGENCY MEDICINE
Payer: COMMERCIAL

## 2024-10-20 ENCOUNTER — APPOINTMENT (OUTPATIENT)
Dept: GENERAL RADIOLOGY | Facility: HOSPITAL | Age: 54
End: 2024-10-20
Attending: EMERGENCY MEDICINE
Payer: COMMERCIAL

## 2024-10-20 ENCOUNTER — HOSPITAL ENCOUNTER (EMERGENCY)
Facility: HOSPITAL | Age: 54
Discharge: HOME | End: 2024-10-20
Attending: EMERGENCY MEDICINE | Admitting: EMERGENCY MEDICINE
Payer: COMMERCIAL

## 2024-10-20 VITALS
DIASTOLIC BLOOD PRESSURE: 101 MMHG | RESPIRATION RATE: 19 BRPM | HEART RATE: 82 BPM | TEMPERATURE: 99.5 F | OXYGEN SATURATION: 92 % | SYSTOLIC BLOOD PRESSURE: 153 MMHG

## 2024-10-20 DIAGNOSIS — E87.1 HYPONATREMIA: ICD-10-CM

## 2024-10-20 DIAGNOSIS — J18.9 COMMUNITY ACQUIRED PNEUMONIA OF LEFT LOWER LOBE OF LUNG: ICD-10-CM

## 2024-10-20 LAB
ALBUMIN SERPL BCG-MCNC: 4 G/DL (ref 3.5–5.2)
ALP SERPL-CCNC: 61 U/L (ref 40–150)
ALT SERPL W P-5'-P-CCNC: 56 U/L (ref 0–70)
ANION GAP SERPL CALCULATED.3IONS-SCNC: 12 MMOL/L (ref 7–15)
AST SERPL W P-5'-P-CCNC: 38 U/L (ref 0–45)
BASOPHILS # BLD AUTO: 0 10E3/UL (ref 0–0.2)
BASOPHILS NFR BLD AUTO: 0 %
BILIRUB SERPL-MCNC: 0.5 MG/DL
BUN SERPL-MCNC: 12.7 MG/DL (ref 6–20)
CALCIUM SERPL-MCNC: 8.8 MG/DL (ref 8.8–10.4)
CHLORIDE SERPL-SCNC: 98 MMOL/L (ref 98–107)
CREAT SERPL-MCNC: 0.91 MG/DL (ref 0.67–1.17)
CRP SERPL-MCNC: 76.36 MG/L
EGFRCR SERPLBLD CKD-EPI 2021: >90 ML/MIN/1.73M2
EOSINOPHIL # BLD AUTO: 0 10E3/UL (ref 0–0.7)
EOSINOPHIL NFR BLD AUTO: 0 %
ERYTHROCYTE [DISTWIDTH] IN BLOOD BY AUTOMATED COUNT: 12.5 % (ref 10–15)
FLUAV RNA SPEC QL NAA+PROBE: NEGATIVE
FLUBV RNA RESP QL NAA+PROBE: NEGATIVE
GLUCOSE SERPL-MCNC: 100 MG/DL (ref 70–99)
HCO3 SERPL-SCNC: 23 MMOL/L (ref 22–29)
HCT VFR BLD AUTO: 43.9 % (ref 40–53)
HGB BLD-MCNC: 14.8 G/DL (ref 13.3–17.7)
HOLD SPECIMEN: NORMAL
HOLD SPECIMEN: NORMAL
IMM GRANULOCYTES # BLD: 0 10E3/UL
IMM GRANULOCYTES NFR BLD: 0 %
INR PPP: 1.01 (ref 0.85–1.15)
LYMPHOCYTES # BLD AUTO: 1 10E3/UL (ref 0.8–5.3)
LYMPHOCYTES NFR BLD AUTO: 16 %
MCH RBC QN AUTO: 29.5 PG (ref 26.5–33)
MCHC RBC AUTO-ENTMCNC: 33.7 G/DL (ref 31.5–36.5)
MCV RBC AUTO: 88 FL (ref 78–100)
MONOCYTES # BLD AUTO: 0.8 10E3/UL (ref 0–1.3)
MONOCYTES NFR BLD AUTO: 12 %
NEUTROPHILS # BLD AUTO: 4.5 10E3/UL (ref 1.6–8.3)
NEUTROPHILS NFR BLD AUTO: 72 %
NRBC # BLD AUTO: 0 10E3/UL
NRBC BLD AUTO-RTO: 0 /100
PLATELET # BLD AUTO: 222 10E3/UL (ref 150–450)
POTASSIUM SERPL-SCNC: 4.1 MMOL/L (ref 3.4–5.3)
PROT SERPL-MCNC: 6.7 G/DL (ref 6.4–8.3)
RBC # BLD AUTO: 5.02 10E6/UL (ref 4.4–5.9)
RSV RNA SPEC NAA+PROBE: NEGATIVE
SARS-COV-2 RNA RESP QL NAA+PROBE: NEGATIVE
SODIUM SERPL-SCNC: 133 MMOL/L (ref 135–145)
WBC # BLD AUTO: 6.3 10E3/UL (ref 4–11)

## 2024-10-20 PROCEDURE — 36415 COLL VENOUS BLD VENIPUNCTURE: CPT | Performed by: EMERGENCY MEDICINE

## 2024-10-20 PROCEDURE — 85610 PROTHROMBIN TIME: CPT | Performed by: EMERGENCY MEDICINE

## 2024-10-20 PROCEDURE — 70450 CT HEAD/BRAIN W/O DYE: CPT

## 2024-10-20 PROCEDURE — 87637 SARSCOV2&INF A&B&RSV AMP PRB: CPT | Performed by: EMERGENCY MEDICINE

## 2024-10-20 PROCEDURE — 82040 ASSAY OF SERUM ALBUMIN: CPT | Performed by: EMERGENCY MEDICINE

## 2024-10-20 PROCEDURE — 99284 EMERGENCY DEPT VISIT MOD MDM: CPT | Performed by: EMERGENCY MEDICINE

## 2024-10-20 PROCEDURE — 99284 EMERGENCY DEPT VISIT MOD MDM: CPT | Mod: 25

## 2024-10-20 PROCEDURE — 71046 X-RAY EXAM CHEST 2 VIEWS: CPT

## 2024-10-20 PROCEDURE — 85025 COMPLETE CBC W/AUTO DIFF WBC: CPT | Performed by: EMERGENCY MEDICINE

## 2024-10-20 PROCEDURE — 86140 C-REACTIVE PROTEIN: CPT | Performed by: EMERGENCY MEDICINE

## 2024-10-20 RX ORDER — AZITHROMYCIN 250 MG/1
TABLET, FILM COATED ORAL
Qty: 6 TABLET | Refills: 0 | Status: SHIPPED | OUTPATIENT
Start: 2024-10-20 | End: 2024-10-25

## 2024-10-20 RX ORDER — AMOXICILLIN 500 MG/1
500 CAPSULE ORAL 2 TIMES DAILY
Qty: 20 CAPSULE | Refills: 0 | Status: SHIPPED | OUTPATIENT
Start: 2024-10-20 | End: 2024-10-30

## 2024-10-20 ASSESSMENT — COLUMBIA-SUICIDE SEVERITY RATING SCALE - C-SSRS
6. HAVE YOU EVER DONE ANYTHING, STARTED TO DO ANYTHING, OR PREPARED TO DO ANYTHING TO END YOUR LIFE?: NO
2. HAVE YOU ACTUALLY HAD ANY THOUGHTS OF KILLING YOURSELF IN THE PAST MONTH?: NO
1. IN THE PAST MONTH, HAVE YOU WISHED YOU WERE DEAD OR WISHED YOU COULD GO TO SLEEP AND NOT WAKE UP?: NO

## 2024-10-20 ASSESSMENT — ACTIVITIES OF DAILY LIVING (ADL)
ADLS_ACUITY_SCORE: 35

## 2024-10-20 NOTE — ED PROVIDER NOTES
History     Chief Complaint   Patient presents with    Nasal Congestion    URI    Headache     HPI  Shane Rai is a 54 year old male who presents with chief complaint of respiratory illness since Thursday, the past 3 days.  He has had a productive cough, and also has developed a frontal headache which at times is severe.  It does appear to respond to Aleve for other medications.  He has had fevers at home.  He is not aware of other people being sick at home or other close contacts being ill.    Allergies:  Allergies   Allergen Reactions    Other Food Allergy Other (See Comments)     Almonds, cherries, apples and walnuts. Apples and cherries cause lip swelling.    Seasonal Allergies      Birch trees, oak trees, grass pollen, mugwart    Zyrtec [Cetirizine] Other (See Comments)     HOT BURNING feeling over entire body.       Problem List:    Patient Active Problem List    Diagnosis Date Noted    Herpes zoster complication 06/08/2013     Priority: Medium    Backache 08/27/2012     Priority: Medium     IMO Update      Hypothyroidism 06/29/2012     Priority: Medium    Allergic rhinitis 01/05/2011     Priority: Medium     IMO Update      Adjustment disorder with depressed mood 09/08/2008     Priority: Medium     More approapriate diagnosis is major depression due to long term hx of depression.      Generalized anxiety disorder 09/08/2008     Priority: Medium    Major depressive disorder, recurrent episode, mild (H) 09/08/2008     Priority: Medium     IMO Update 10/11      Esophageal reflux 03/22/2007     Priority: Medium    Abdominal pain, epigastric 02/27/2007     Priority: Medium    Anxiety state 04/26/2006     Priority: Medium     IMO Update 10/11          Past Medical History:    Past Medical History:   Diagnosis Date    Gastro-oesophageal reflux disease     Hypothyroidism        Past Surgical History:    Past Surgical History:   Procedure Laterality Date    deviated septum[      ESOPHAGOSCOPY, GASTROSCOPY,  DUODENOSCOPY (EGD), COMBINED  2014    Procedure: COMBINED ESOPHAGOSCOPY, GASTROSCOPY, DUODENOSCOPY (EGD);  UPPER ENDOSCOPY WITH BIOPSY;  Surgeon: Dima Peraza MD;  Location: [facility] OR    VASECTOMY         Family History:    No family history on file.    Social History:  Marital Status:   [2]  Social History     Tobacco Use    Smoking status: Former     Current packs/day: 0.00     Types: Cigarettes     Quit date: 2010     Years since quittin.8    Smokeless tobacco: Never   Substance Use Topics    Alcohol use: No    Drug use: Never        Medications:    amoxicillin (AMOXIL) 500 MG capsule  azithromycin (ZITHROMAX Z-RAMÓN) 250 MG tablet  albuterol (PROAIR HFA/PROVENTIL HFA/VENTOLIN HFA) 108 (90 Base) MCG/ACT inhaler  albuterol (PROAIR HFA/PROVENTIL HFA/VENTOLIN HFA) 108 (90 Base) MCG/ACT inhaler  budesonide (PULMICORT) 0.5 MG/2ML neb solution  clobetasol (TEMOVATE) 0.05 % ointment  desonide (DESOWEN) 0.05 % external cream  dexlansoprazole (DEXILANT) 30 MG CPDR CR capsule  EPINEPHrine (ANY BX GENERIC EQUIV) 0.3 MG/0.3ML injection 2-pack  EPINEPHrine (ANY BX GENERIC EQUIV) 0.3 MG/0.3ML injection 2-pack  fluticasone (FLONASE) 50 MCG/ACT nasal spray  fluticasone-salmeterol (ADVAIR) 250-50 MCG/ACT inhaler  levothyroxine (SYNTHROID/LEVOTHROID) 125 MCG tablet  LEVOTHYROXINE SODIUM PO  mometasone (NASONEX) 50 MCG/ACT nasal spray  montelukast (SINGULAIR) 10 MG tablet  ORDER FOR ALLERGEN IMMUNOTHERAPY          Review of Systems    Physical Exam   BP: (!) 166/103  Pulse: 91  Temp: 99.5  F (37.5  C)  Resp: 16  SpO2: 93 %      Physical Exam    ED Course        Procedures            Results for orders placed or performed during the hospital encounter of 10/20/24 (from the past 24 hour(s))   Symptomatic Influenza A/B, RSV, & SARS-CoV2 PCR (COVID-19) Nasopharyngeal    Specimen: Nasopharyngeal; Swab   Result Value Ref Range    Influenza A PCR Negative Negative    Influenza B PCR Negative Negative    RSV PCR  Negative Negative    SARS CoV2 PCR Negative Negative    Narrative    Testing was performed using the Xpert Xpress CoV2/Flu/RSV Assay on the [employer] GeneXpert Instrument. This test should be ordered for the detection of SARS-CoV2, influenza, and RSV viruses in individuals with signs and symptoms of respiratory tract infection. This test is for in vitro diagnostic use under the [place] FDA for laboratories certified under CLIA to perform high or moderate complexity testing. This test has been [place] FDA cleared. A negative result does not rule out the presence of PCR inhibitors in the specimen or target RNA in concentration below the limit of detection for the assay. If only one viral target is positive but coinfection with multiple targets is suspected, the sample should be re-tested with another FDA cleared, approved, or authorized test, if coninfection would change clinical management. This test was validated by the [facility]. These laboratories are certified under the Clinical Laboratory Improvement Amendments of 1988 (CLIA-88) as qualified to perfom high complexity laboratory testing.   Comprehensive metabolic panel   Result Value Ref Range    Sodium 133 (L) 135 - 145 mmol/L    Potassium 4.1 3.4 - 5.3 mmol/L    Carbon Dioxide (CO2) 23 22 - 29 mmol/L    Anion Gap 12 7 - 15 mmol/L    Urea Nitrogen 12.7 6.0 - 20.0 mg/dL    Creatinine 0.91 0.67 - 1.17 mg/dL    GFR Estimate >90 >60 mL/min/1.73m2    Calcium 8.8 8.8 - 10.4 mg/dL    Chloride 98 98 - 107 mmol/L    Glucose 100 (H) 70 - 99 mg/dL    Alkaline Phosphatase 61 40 - 150 U/L    AST 38 0 - 45 U/L    ALT 56 0 - 70 U/L    Protein Total 6.7 6.4 - 8.3 g/dL    Albumin 4.0 3.5 - 5.2 g/dL    Bilirubin Total 0.5 <=1.2 mg/dL   CBC with platelets differential    Narrative    The following orders were created for panel order CBC with platelets differential.  Procedure                               Abnormality         Status                     ---------                                -----------         ------                     CBC with platelets and d...[[id number]]                      Final result                 Please view results for these tests on the individual orders.   CRP inflammation   Result Value Ref Range    CRP Inflammation 76.36 (H) <5.00 mg/L   INR   Result Value Ref Range    INR 1.01 0.85 - 1.15   CBC with platelets and differential   Result Value Ref Range    WBC Count 6.3 4.0 - 11.0 10e3/uL    RBC Count 5.02 4.40 - 5.90 10e6/uL    Hemoglobin 14.8 13.3 - 17.7 g/dL    Hematocrit 43.9 40.0 - 53.0 %    MCV 88 78 - 100 fL    MCH 29.5 26.5 - 33.0 pg    MCHC 33.7 31.5 - 36.5 g/dL    RDW 12.5 10.0 - 15.0 %    Platelet Count 222 150 - 450 10e3/uL    % Neutrophils 72 %    % Lymphocytes 16 %    % Monocytes 12 %    % Eosinophils 0 %    % Basophils 0 %    % Immature Granulocytes 0 %    NRBCs per 100 WBC 0 <1 /100    Absolute Neutrophils 4.5 1.6 - 8.3 10e3/uL    Absolute Lymphocytes 1.0 0.8 - 5.3 10e3/uL    Absolute Monocytes 0.8 0.0 - 1.3 10e3/uL    Absolute Eosinophils 0.0 0.0 - 0.7 10e3/uL    Absolute Basophils 0.0 0.0 - 0.2 10e3/uL    Absolute Immature Granulocytes 0.0 <=0.4 10e3/uL    Absolute NRBCs 0.0 10e3/uL   Extra Tube    Narrative    The following orders were created for panel order Extra Tube.  Procedure                               Abnormality         Status                     ---------                               -----------         ------                     Extra Red Top Tube[[id number]]                               Final result               Extra Heparinized Syringe[[id number]]                        Final result                 Please view results for these tests on the individual orders.   Extra Red Top Tube   Result Value Ref Range    Hold Specimen JIC    Extra Heparinized Syringe   Result Value Ref Range    Hold Specimen JIC        Medications - No data to display    Assessments & Plan (with Medical Decision Making)     I have reviewed the  nursing notes.    I have reviewed the findings, diagnosis, plan and need for follow up with the patient.    Medical Decision Making  The patient's presentation was of moderate complexity (an acute illness with systemic symptoms).    The patient's evaluation involved:  strong consideration of a test (CT chest) that was ultimately deferred  ordering and/or review of 3+ test(s) in this encounter (labs, CXR)    The patient's management necessitated moderate risk (prescription drug management including medications given in the ED).    Patient was found to have a left lower lobe pneumonia on PA and lateral plain films.  He has a normal white blood cell count, and oxygen saturation of 93% on room air.  He does have a mildly low sodium, but has no known water exposure that would suggest Legionella.  I will treat with amoxicillin as well as azithromycin to cover both typical and atypical organisms.  Patient does not appear to require inpatient treatment at this time.  CT head was performed due to patient's complaint of headache, and was negative.  There is no evidence of intracranial hemorrhage, and patient relates that his pain was minimal at this point. Patient was discharged with instructions to arrange follow-up imaging with his PCP for recheck of chest x-ray.    Discharge Medication List as of 10/20/2024  2:57 PM        START taking these medications    Details   amoxicillin (AMOXIL) 500 MG capsule Take 1 capsule (500 mg) by mouth 2 times daily for 10 days., Disp-20 capsule, R-0, E-Prescribe      azithromycin (ZITHROMAX Z-RAMÓN) 250 MG tablet Two tablets on the first day, then one tablet daily for the next 4 days, Disp-6 tablet, R-0, E-Prescribe             Final diagnoses:   Community acquired pneumonia of left lower lobe of lung   Hyponatremia       10/20/2024   [facility]       Delvis Montilla DO  10/20/24 1709

## 2024-10-20 NOTE — ED TRIAGE NOTES
"Patient was evaluated in triage by Urgent Care provider and deemed inappropriate for UC.    Patient to be seen in Emergency Department.    Pt presents with nasal congestion, headache, neck \"stiffness\" for the past week or so. Pt able to touch chin to chest. Pt reports \"worst headache of his life\" in the frontal and temporal region that has been varying in severity since Friday. Pt states he has been using ibuprofen and naproxen with some relief, but headache comes back. Pt reports some nausea, no V/D. Pt reports \"I have a weird taste in my mouth.\" No visual disturbances. Pt states he's tried his netti pot with out relief of pressure.     Pt denies hx of hypertension. Pt denies smoking, no COPD. Pt reports negative home COVID today.            "

## 2024-10-20 NOTE — ED NOTES
Went to waiting room to get patient, he had just went to X-Ray. Called X-Ray and they will bring patient to room 3 instead of back to lobby. Brought wife back.

## 2024-10-20 NOTE — ED NOTES
Patient presents today with complaints of being sick with a respiratory bug since Thursday (3 days). He has a good productive cough (he has not seen it, he swallows it.) He has had fevers up to 103 F. They sometimes react to Tylenol and sometimes not. No shortness of breath reported.

## 2024-11-11 ENCOUNTER — ALLIED HEALTH/NURSE VISIT (OUTPATIENT)
Dept: ALLERGY | Facility: OTHER | Age: 54
End: 2024-11-11
Attending: PHYSICIAN ASSISTANT
Payer: COMMERCIAL

## 2024-11-11 DIAGNOSIS — J30.89 PERENNIAL ALLERGIC RHINITIS: Primary | ICD-10-CM

## 2024-11-11 PROCEDURE — 95117 IMMUNOTHERAPY INJECTIONS: CPT

## 2024-11-11 NOTE — PROGRESS NOTES
Prior to injection verified patient identity using patient name and date of birth.    Questions asked: Yes    Patient was given allergy injection(s) of 0.5 mL from Red vial given in Both arm(s).    Injection(s) charted on paper allergy flow sheet.    Epipen present at appointment.    Patient left against medical advice (AMA), signed form and did not stay for the observation period.  Patient was instructed to seek medical attention/go to the emergency room if having any reaction symptoms or needing to use their Epipen, patient aware and acknowledged. Patient signed out AMA form at 1612 and ambulated out of the clinic.

## 2024-11-25 ENCOUNTER — ALLIED HEALTH/NURSE VISIT (OUTPATIENT)
Dept: ALLERGY | Facility: OTHER | Age: 54
End: 2024-11-25
Attending: PHYSICIAN ASSISTANT
Payer: COMMERCIAL

## 2024-11-25 DIAGNOSIS — J30.89 PERENNIAL ALLERGIC RHINITIS: Primary | ICD-10-CM

## 2024-11-25 NOTE — PROGRESS NOTES
Prior to injection verified patient identity using patient name and date of birth.    Questions asked: Yes    Patient was given allergy injection(s) of 0.5 mL from Red vial given in Both arm(s).    Injection(s) charted on paper allergy flow sheet.    Epipen present at appointment.    Patient left against medical advice (AMA), signed form and did not stay for the observation period.  Patient was instructed to seek medical attention/go to the emergency room if having any reaction symptoms or needing to use their Epipen, patient aware and acknowledged. Patient signed out AMA form at 1607 and ambulated out of the clinic.

## 2024-12-09 ENCOUNTER — ALLIED HEALTH/NURSE VISIT (OUTPATIENT)
Dept: ALLERGY | Facility: OTHER | Age: 54
End: 2024-12-09
Attending: PHYSICIAN ASSISTANT
Payer: COMMERCIAL

## 2024-12-09 DIAGNOSIS — J30.89 PERENNIAL ALLERGIC RHINITIS: Primary | ICD-10-CM

## 2024-12-09 NOTE — PROGRESS NOTES
Prior to injection verified patient identity using patient name and date of birth.    Questions asked: Yes    Patient was given allergy injection(s) of 0.5 mL from Red vial given in Both arm(s).    Injection(s) charted on paper allergy flow sheet.    Epipen present at appointment.    Patient left against medical advice (AMA), signed form and did not stay for the observation period.  Patient was instructed to seek medical attention/go to the emergency room if having any reaction symptoms or needing to use their Epipen, patient aware and acknowledged. Patient signed out AMA form at 1605 and ambulated out of the clinic.

## 2024-12-23 ENCOUNTER — ALLIED HEALTH/NURSE VISIT (OUTPATIENT)
Dept: ALLERGY | Facility: OTHER | Age: 54
End: 2024-12-23
Attending: PHYSICIAN ASSISTANT
Payer: COMMERCIAL

## 2024-12-23 DIAGNOSIS — J30.89 PERENNIAL ALLERGIC RHINITIS: Primary | ICD-10-CM

## 2025-01-06 ENCOUNTER — ALLIED HEALTH/NURSE VISIT (OUTPATIENT)
Dept: ALLERGY | Facility: OTHER | Age: 55
End: 2025-01-06
Attending: PHYSICIAN ASSISTANT
Payer: COMMERCIAL

## 2025-01-06 DIAGNOSIS — J30.89 PERENNIAL ALLERGIC RHINITIS: Primary | ICD-10-CM

## 2025-01-06 PROCEDURE — 95117 IMMUNOTHERAPY INJECTIONS: CPT

## 2025-01-20 ENCOUNTER — ALLIED HEALTH/NURSE VISIT (OUTPATIENT)
Dept: ALLERGY | Facility: OTHER | Age: 55
End: 2025-01-20
Attending: PHYSICIAN ASSISTANT
Payer: COMMERCIAL

## 2025-01-20 DIAGNOSIS — J30.89 PERENNIAL ALLERGIC RHINITIS: Primary | ICD-10-CM

## 2025-01-20 PROCEDURE — 95117 IMMUNOTHERAPY INJECTIONS: CPT

## 2025-01-20 NOTE — PROGRESS NOTES
Prior to injection patient identity verified using name and date of birth.     SVT done on left arm, measuring 9 MM. Passed     SVT done on right arm, measuring 7 MM. Passed     Documented on paper flow sheet.     Allergy injection given and charted on paper allergy flow sheet. Patient signed out AMA. Prior to injection verified patient identity using patient name and date of birth.    Questions asked: Yes    Patient was given allergy injection(s) of 0.5 mL from Red vial given in Both arm(s).    Injection(s) charted on paper allergy flow sheet.    Epipen present at appointment.    Patient left against medical advice (AMA), signed form and did not stay for the observation period.  Patient was instructed to seek medical attention/go to the emergency room if having any reaction symptoms or needing to use their Epipen, patient aware and acknowledged. Patient signed out AMA form at 1628 and ambulated out of the clinic.

## 2025-02-03 ENCOUNTER — ALLIED HEALTH/NURSE VISIT (OUTPATIENT)
Dept: ALLERGY | Facility: OTHER | Age: 55
End: 2025-02-03
Attending: PHYSICIAN ASSISTANT
Payer: COMMERCIAL

## 2025-02-03 DIAGNOSIS — J30.89 PERENNIAL ALLERGIC RHINITIS: Primary | ICD-10-CM

## 2025-02-17 ENCOUNTER — ALLIED HEALTH/NURSE VISIT (OUTPATIENT)
Dept: ALLERGY | Facility: OTHER | Age: 55
End: 2025-02-17
Attending: PHYSICIAN ASSISTANT
Payer: COMMERCIAL

## 2025-02-17 DIAGNOSIS — J30.89 PERENNIAL ALLERGIC RHINITIS: Primary | ICD-10-CM

## 2025-03-09 ENCOUNTER — HEALTH MAINTENANCE LETTER (OUTPATIENT)
Age: 55
End: 2025-03-09

## 2025-03-17 ENCOUNTER — ALLIED HEALTH/NURSE VISIT (OUTPATIENT)
Dept: ALLERGY | Facility: OTHER | Age: 55
End: 2025-03-17
Attending: PHYSICIAN ASSISTANT
Payer: COMMERCIAL

## 2025-03-17 DIAGNOSIS — J30.89 PERENNIAL ALLERGIC RHINITIS: Primary | ICD-10-CM

## 2025-03-30 DIAGNOSIS — J30.89 PERENNIAL ALLERGIC RHINITIS: ICD-10-CM

## 2025-03-30 DIAGNOSIS — R06.00 DYSPNEA, UNSPECIFIED TYPE: ICD-10-CM

## 2025-03-31 ENCOUNTER — ALLIED HEALTH/NURSE VISIT (OUTPATIENT)
Dept: ALLERGY | Facility: OTHER | Age: 55
End: 2025-03-31
Attending: PHYSICIAN ASSISTANT
Payer: COMMERCIAL

## 2025-03-31 ENCOUNTER — TELEPHONE (OUTPATIENT)
Dept: ALLERGY | Facility: OTHER | Age: 55
End: 2025-03-31

## 2025-03-31 DIAGNOSIS — J30.89 PERENNIAL ALLERGIC RHINITIS: Primary | ICD-10-CM

## 2025-03-31 RX ORDER — MONTELUKAST SODIUM 10 MG/1
1 TABLET ORAL AT BEDTIME
Qty: 90 TABLET | Refills: 3 | Status: SHIPPED | OUTPATIENT
Start: 2025-03-31

## 2025-04-05 DIAGNOSIS — R09.A2 GLOBUS SENSATION: ICD-10-CM

## 2025-04-05 DIAGNOSIS — R09.82 POST-NASAL DRAINAGE: ICD-10-CM

## 2025-04-07 NOTE — TELEPHONE ENCOUNTER
Nasonex, Nexium      Last Written Prescription Date:  3.17.25, 3.14.25  Last Fill Quantity: #17g, #30,   # refills: 0  Last Office Visit: 3.19.25  Future Office visit:       Routing refill request to provider for review/approval because:

## 2025-04-08 RX ORDER — ESOMEPRAZOLE MAGNESIUM 40 MG/1
40 CAPSULE, DELAYED RELEASE ORAL
Qty: 90 CAPSULE | Refills: 0 | Status: SHIPPED | OUTPATIENT
Start: 2025-04-08

## 2025-04-08 RX ORDER — MOMETASONE FUROATE MONOHYDRATE 50 UG/1
SPRAY, METERED NASAL
Qty: 51 G | Refills: 0 | Status: SHIPPED | OUTPATIENT
Start: 2025-04-08

## 2025-04-14 ENCOUNTER — ALLIED HEALTH/NURSE VISIT (OUTPATIENT)
Dept: ALLERGY | Facility: OTHER | Age: 55
End: 2025-04-14
Attending: PHYSICIAN ASSISTANT
Payer: COMMERCIAL

## 2025-04-14 DIAGNOSIS — J30.89 PERENNIAL ALLERGIC RHINITIS: Primary | ICD-10-CM

## 2025-04-14 PROCEDURE — 95117 IMMUNOTHERAPY INJECTIONS: CPT

## 2025-04-23 DIAGNOSIS — R09.82 POST-NASAL DRAINAGE: ICD-10-CM

## 2025-04-24 RX ORDER — MOMETASONE FUROATE MONOHYDRATE 50 UG/1
SPRAY, METERED NASAL
Qty: 16 G | Refills: 11 | Status: SHIPPED | OUTPATIENT
Start: 2025-04-24

## 2025-04-24 NOTE — TELEPHONE ENCOUNTER
Nasonex  Last Written Prescription Date: 4/8/25  Last Fill Quantity: 51 g # of Refills: 0  Last Office Visit: 3/14/25

## 2025-04-28 ENCOUNTER — ALLIED HEALTH/NURSE VISIT (OUTPATIENT)
Dept: ALLERGY | Facility: OTHER | Age: 55
End: 2025-04-28
Attending: PHYSICIAN ASSISTANT
Payer: COMMERCIAL

## 2025-04-28 DIAGNOSIS — J30.89 PERENNIAL ALLERGIC RHINITIS: Primary | ICD-10-CM

## 2025-04-28 PROCEDURE — 95117 IMMUNOTHERAPY INJECTIONS: CPT

## 2025-04-29 ENCOUNTER — ALLIED HEALTH/NURSE VISIT (OUTPATIENT)
Dept: ALLERGY | Facility: OTHER | Age: 55
End: 2025-04-29
Attending: PHYSICIAN ASSISTANT
Payer: COMMERCIAL

## 2025-04-29 DIAGNOSIS — J30.89 PERENNIAL ALLERGIC RHINITIS: Primary | ICD-10-CM

## 2025-04-29 NOTE — PROGRESS NOTES
Allergy serum is mixed today at schedule red maintenance,  into  2  (5 ml)  multi dose vial/vials.    Allergens included were:    Ragweed  0.2 ml of dilution # 1  Pigweed  0.2 ml of dilution # 1  Mugwort 0.2 ml of dilution # 0  Kochia  0.2 ml of dilution # 0  Russian Thistle 0.2 ml of dilution # 1  Romel Grass 0.2 ml of dilution # 1  Birch mix 0.2 ml of dilution # 1  Maple Mix 0.2 of dilution # 1  Elm Mix 0.2 ml of dilution # 1  Oak Mix 0.2 ml of dilution # 1  Chele Mix 0.2 ml of dilution # 0  Pine Mix 0.2 ml of dilution # 0  E[place] 0.2 ml of dilution # 1  Black [place] 0.2 ml of dilution # 0  Aspen 0.2 ml of dilution # 0  Red [place] 0.2 ml of dilution # 0    Alternaria 0.2 ml of dilution # 1  Aspergillus 0.2 ml of dilution # 1  Hormodendrum 0.2 ml of dilution # 1  Helminthosporium 0.2 ml of dilution # 1  Penicillium 0.2 ml of dilution # 1  Epicoccum 0.2 ml of dilution # 1  Fusarium 0.2 ml of dilution # 0  Mucor 0.2 ml of dilution # 0  Grain Smut 0.2 ml of dilution # 0  Grass Smut 0.2 ml of dilution # 0  Cat 0.2 ml of dilution # 1  Dog 0.2 ml of dilution # 1  Feather Mix 0.2 ml of dilution # 0  Dust Mite Mix 0.2 ml of dilution # 1  Horse 0.2 ml of dilution # 0

## 2025-05-27 ENCOUNTER — ALLIED HEALTH/NURSE VISIT (OUTPATIENT)
Dept: ALLERGY | Facility: OTHER | Age: 55
End: 2025-05-27
Attending: PHYSICIAN ASSISTANT
Payer: COMMERCIAL

## 2025-05-27 DIAGNOSIS — J30.89 PERENNIAL ALLERGIC RHINITIS: Primary | ICD-10-CM

## 2025-05-27 NOTE — PROGRESS NOTES
Documented on paper flow sheet.     Allergy injection given and charted on paper allergy flow sheet. Patient signed out AMA. Prior to injection verified patient identity using patient name and date of birth.    Questions asked: Yes    Patient was given allergy injection(s) of 0.5 mL from Red vial given in Both arm(s).    Injection(s) charted on paper allergy flow sheet.    Epipen present at appointment.    Patient left against medical advice (AMA), signed form and did not stay for the observation period.  Patient was instructed to seek medical attention/go to the emergency room if having any reaction symptoms or needing to use their Epipen, patient aware and acknowledged. Patient signed out AMA form at 1615 and ambulated out of the clinic.

## 2025-06-09 ENCOUNTER — MYC REFILL (OUTPATIENT)
Dept: ALLERGY | Facility: OTHER | Age: 55
End: 2025-06-09

## 2025-06-09 DIAGNOSIS — J30.89 PERENNIAL ALLERGIC RHINITIS: ICD-10-CM

## 2025-06-09 NOTE — TELEPHONE ENCOUNTER
EpiPen      Last Written Prescription Date:  7/16/24  Last Fill Quantity: 2,   # refills: 1  Last Office Visit: 3/14/25  Future Office visit:       Routing refill request to provider for review/approval because:

## 2025-06-10 ENCOUNTER — ALLIED HEALTH/NURSE VISIT (OUTPATIENT)
Dept: ALLERGY | Facility: OTHER | Age: 55
End: 2025-06-10
Attending: PHYSICIAN ASSISTANT
Payer: COMMERCIAL

## 2025-06-10 DIAGNOSIS — J30.89 PERENNIAL ALLERGIC RHINITIS: Primary | ICD-10-CM

## 2025-06-10 NOTE — TELEPHONE ENCOUNTER
Anaphylaxis Kits Protocol [id number]/09/2025 08:49 AM   Protocol Details Medication incated for associated diagnosis

## 2025-06-11 RX ORDER — EPINEPHRINE 0.3 MG/.3ML
0.3 INJECTION SUBCUTANEOUS PRN
Qty: 2 EACH | Refills: 1 | Status: SHIPPED | OUTPATIENT
Start: 2025-06-11

## 2025-06-24 ENCOUNTER — ALLIED HEALTH/NURSE VISIT (OUTPATIENT)
Dept: ALLERGY | Facility: OTHER | Age: 55
End: 2025-06-24
Attending: PHYSICIAN ASSISTANT
Payer: COMMERCIAL

## 2025-06-24 DIAGNOSIS — J30.89 PERENNIAL ALLERGIC RHINITIS: Primary | ICD-10-CM

## 2025-07-08 ENCOUNTER — ALLIED HEALTH/NURSE VISIT (OUTPATIENT)
Dept: ALLERGY | Facility: OTHER | Age: 55
End: 2025-07-08
Attending: PHYSICIAN ASSISTANT
Payer: COMMERCIAL

## 2025-07-08 DIAGNOSIS — J30.89 PERENNIAL ALLERGIC RHINITIS: Primary | ICD-10-CM

## 2025-07-18 ENCOUNTER — HOSPITAL ENCOUNTER (EMERGENCY)
Facility: HOSPITAL | Age: 55
Discharge: HOME | End: 2025-07-18
Attending: PHYSICIAN ASSISTANT | Admitting: PHYSICIAN ASSISTANT
Payer: COMMERCIAL

## 2025-07-18 VITALS
RESPIRATION RATE: 18 BRPM | TEMPERATURE: 98.3 F | DIASTOLIC BLOOD PRESSURE: 120 MMHG | HEART RATE: 79 BPM | OXYGEN SATURATION: 95 % | SYSTOLIC BLOOD PRESSURE: 167 MMHG

## 2025-07-18 DIAGNOSIS — R35.0 URINARY FREQUENCY: ICD-10-CM

## 2025-07-18 DIAGNOSIS — T78.40XA ALLERGIC REACTION TO DRUG, INITIAL ENCOUNTER: ICD-10-CM

## 2025-07-18 DIAGNOSIS — N40.0 ENLARGED PROSTATE: ICD-10-CM

## 2025-07-18 LAB
ALBUMIN UR-MCNC: NEGATIVE MG/DL
APPEARANCE UR: CLEAR
BILIRUB UR QL STRIP: NEGATIVE
COLOR UR AUTO: ABNORMAL
GLUCOSE UR STRIP-MCNC: NEGATIVE MG/DL
HGB UR QL STRIP: NEGATIVE
KETONES UR STRIP-MCNC: NEGATIVE MG/DL
LEUKOCYTE ESTERASE UR QL STRIP: NEGATIVE
MUCOUS THREADS #/AREA URNS LPF: PRESENT /LPF
NITRATE UR QL: NEGATIVE
PH UR STRIP: 6 [PH] (ref 4.7–8)
RBC URINE: <1 /HPF
SP GR UR STRIP: 1.02 (ref 1–1.03)
SQUAMOUS EPITHELIAL: 0 /HPF
UROBILINOGEN UR STRIP-MCNC: NORMAL MG/DL
WBC URINE: <1 /HPF

## 2025-07-18 PROCEDURE — 81001 URINALYSIS AUTO W/SCOPE: CPT | Performed by: PHYSICIAN ASSISTANT

## 2025-07-18 PROCEDURE — 99284 EMERGENCY DEPT VISIT MOD MDM: CPT | Performed by: PHYSICIAN ASSISTANT

## 2025-07-18 PROCEDURE — 250N000011 HC RX IP 250 OP 636: Mod: JZ | Performed by: PHYSICIAN ASSISTANT

## 2025-07-18 PROCEDURE — 96372 THER/PROPH/DIAG INJ SC/IM: CPT | Performed by: PHYSICIAN ASSISTANT

## 2025-07-18 RX ORDER — PHENAZOPYRIDINE HYDROCHLORIDE 200 MG/1
200 TABLET, FILM COATED ORAL 3 TIMES DAILY PRN
Qty: 9 TABLET | Refills: 0 | Status: SHIPPED | OUTPATIENT
Start: 2025-07-18 | End: 2025-07-21

## 2025-07-18 RX ORDER — PREDNISONE 20 MG/1
TABLET ORAL
Qty: 10 TABLET | Refills: 0 | Status: SHIPPED | OUTPATIENT
Start: 2025-07-18

## 2025-07-18 RX ORDER — METHYLPREDNISOLONE SODIUM SUCCINATE 125 MG/2ML
125 INJECTION INTRAMUSCULAR; INTRAVENOUS ONCE
Status: COMPLETED | OUTPATIENT
Start: 2025-07-18 | End: 2025-07-18

## 2025-07-18 RX ORDER — SULFAMETHOXAZOLE AND TRIMETHOPRIM 800; 160 MG/1; MG/1
1 TABLET ORAL 2 TIMES DAILY
Qty: 28 TABLET | Refills: 0 | Status: SHIPPED | OUTPATIENT
Start: 2025-07-18 | End: 2025-08-01

## 2025-07-18 RX ADMIN — METHYLPREDNISOLONE SODIUM SUCCINATE 125 MG: 125 INJECTION, POWDER, FOR SOLUTION INTRAMUSCULAR; INTRAVENOUS at 20:40

## 2025-07-18 ASSESSMENT — COLUMBIA-SUICIDE SEVERITY RATING SCALE - C-SSRS
1. IN THE PAST MONTH, HAVE YOU WISHED YOU WERE DEAD OR WISHED YOU COULD GO TO SLEEP AND NOT WAKE UP?: NO
6. HAVE YOU EVER DONE ANYTHING, STARTED TO DO ANYTHING, OR PREPARED TO DO ANYTHING TO END YOUR LIFE?: NO
2. HAVE YOU ACTUALLY HAD ANY THOUGHTS OF KILLING YOURSELF IN THE PAST MONTH?: NO

## 2025-07-18 ASSESSMENT — ACTIVITIES OF DAILY LIVING (ADL): ADLS_ACUITY_SCORE: 41

## 2025-07-20 ASSESSMENT — ENCOUNTER SYMPTOMS
CARDIOVASCULAR NEGATIVE: 1
CONSTITUTIONAL NEGATIVE: 1
ENDOCRINE NEGATIVE: 1
MUSCULOSKELETAL NEGATIVE: 1
GASTROINTESTINAL NEGATIVE: 1
DIFFICULTY URINATING: 1
FREQUENCY: 1
HEMATOLOGIC/LYMPHATIC NEGATIVE: 1
PSYCHIATRIC NEGATIVE: 1
ALLERGIC/IMMUNOLOGIC NEGATIVE: 1
EYES NEGATIVE: 1
RESPIRATORY NEGATIVE: 1
NEUROLOGICAL NEGATIVE: 1

## 2025-07-30 ENCOUNTER — HOSPITAL ENCOUNTER (EMERGENCY)
Facility: HOSPITAL | Age: 55
Discharge: HOME | End: 2025-07-30
Attending: STUDENT IN AN ORGANIZED HEALTH CARE EDUCATION/TRAINING PROGRAM
Payer: COMMERCIAL

## 2025-07-30 VITALS
SYSTOLIC BLOOD PRESSURE: 173 MMHG | WEIGHT: 203.81 LBS | HEART RATE: 95 BPM | DIASTOLIC BLOOD PRESSURE: 99 MMHG | RESPIRATION RATE: 18 BRPM | OXYGEN SATURATION: 97 % | TEMPERATURE: 98.4 F | HEIGHT: 70 IN | BODY MASS INDEX: 29.18 KG/M2

## 2025-07-30 DIAGNOSIS — L30.9 DERMATITIS: Primary | ICD-10-CM

## 2025-07-30 PROCEDURE — 99283 EMERGENCY DEPT VISIT LOW MDM: CPT | Performed by: STUDENT IN AN ORGANIZED HEALTH CARE EDUCATION/TRAINING PROGRAM

## 2025-07-30 RX ORDER — TRIAMCINOLONE ACETONIDE 1 MG/G
CREAM TOPICAL
Qty: 80 G | Refills: 1 | Status: SHIPPED | OUTPATIENT
Start: 2025-07-30 | End: 2025-08-13

## 2025-07-30 ASSESSMENT — ENCOUNTER SYMPTOMS
SHORTNESS OF BREATH: 0
ROS SKIN COMMENTS: ITCHINESS

## 2025-07-30 ASSESSMENT — ACTIVITIES OF DAILY LIVING (ADL): ADLS_ACUITY_SCORE: 41

## 2025-07-30 ASSESSMENT — COLUMBIA-SUICIDE SEVERITY RATING SCALE - C-SSRS
2. HAVE YOU ACTUALLY HAD ANY THOUGHTS OF KILLING YOURSELF IN THE PAST MONTH?: NO
1. IN THE PAST MONTH, HAVE YOU WISHED YOU WERE DEAD OR WISHED YOU COULD GO TO SLEEP AND NOT WAKE UP?: NO
6. HAVE YOU EVER DONE ANYTHING, STARTED TO DO ANYTHING, OR PREPARED TO DO ANYTHING TO END YOUR LIFE?: NO

## 2025-07-31 NOTE — ED NOTES
Face to face report given with opportunity to observe patient.    Report given to DEYVI Gambino RN   7/30/2025  9:28 PM

## 2025-07-31 NOTE — DISCHARGE INSTRUCTIONS
As discussed this does appear to be some sort of allergic reaction.  A prescription strength steroid cream was sent to the [facility] pharmacy.  You may use this 2-3 times a day to help with itchiness.      Ultimately I think following up with your primary provider for further management and referrals as needed for further evaluation would be appropriate.  If you develop new or worsening symptoms the meantime such as increasing swelling, difficulty breathing or shortness of breath you should return to the emergency room for evaluation.

## 2025-07-31 NOTE — ED NOTES
Patient reports that he has a rash throughout body- worse on chest, recently has spread to lower lip. Itching rash seems to make it spread, denies any pain.Took Claritin this AM, denies any benadryl.

## 2025-07-31 NOTE — ED PROVIDER NOTES
History     Chief Complaint   Patient presents with    Rash     HPI  Shane Rai is a 55 year old male who presents for concerns of an itchy rash that is diffuse throughout his body.  He is been seen for this previously and was started on prednisone.  He states that he was taking a prednisone but he started feeling flushed in the face and side effects were not desirable so he stopped taking it.  He is unsure as to what the potential cause of the skin rash is.  He does not know if these are due to bites, or possible allergens, or contact with something.  He states he feels like over the last couple of days its gotten worse after he mowed the lawn.  He is also had some scratchiness to the throat.  He denies any difficulty with his breathing or shortness of breath.    Allergies:  Allergies   Allergen Reactions    Other Food Allergy Other (See Comments)     Almonds, cherries, apples and walnuts. Apples and cherries cause lip swelling.    Seasonal Allergies      Birch trees, oak trees, grass pollen, mugwart    Zyrtec [Cetirizine] Other (See Comments)     HOT BURNING feeling over entire body.       Problem List:    Patient Active Problem List    Diagnosis Date Noted    Herpes zoster complication 06/08/2013     Priority: Medium    Backache 08/27/2012     Priority: Medium     IMO Update      Hypothyroidism 06/29/2012     Priority: Medium    Allergic rhinitis 01/05/2011     Priority: Medium     IMO Update      Adjustment disorder with depressed mood 09/08/2008     Priority: Medium     More approapriate diagnosis is major depression due to long term hx of depression.      Generalized anxiety disorder 09/08/2008     Priority: Medium    Major depressive disorder, recurrent episode, mild 09/08/2008     Priority: Medium     IMO Update 10/11      Esophageal reflux 03/22/2007     Priority: Medium    Abdominal pain, epigastric 02/27/2007     Priority: Medium    Anxiety state 04/26/2006     Priority: Medium     IMO Update 10/11           Past Medical History:    Past Medical History:   Diagnosis Date    Gastro-oesophageal reflux disease     Hypothyroidism        Past Surgical History:    Past Surgical History:   Procedure Laterality Date    deviated septum[      ESOPHAGOSCOPY, GASTROSCOPY, DUODENOSCOPY (EGD), COMBINED  2/28/2014    Procedure: COMBINED ESOPHAGOSCOPY, GASTROSCOPY, DUODENOSCOPY (EGD);  UPPER ENDOSCOPY WITH BIOPSY;  Surgeon: Dima Peraza MD;  Location: [facility] OR    VASECTOMY         Family History:    No family history on file.    Social History:  Marital Status:   [2]  Social History     Tobacco Use    Smoking status: Former     Current packs/day: 0.00     Types: Cigarettes     Quit date: 1/1/2010     Years since quitting: 15.5    Smokeless tobacco: Never   Substance Use Topics    Alcohol use: No    Drug use: Never        Medications:    triamcinolone (KENALOG) 0.1 % external cream  albuterol (PROAIR HFA/PROVENTIL HFA/VENTOLIN HFA) 108 (90 Base) MCG/ACT inhaler  albuterol (PROAIR HFA/PROVENTIL HFA/VENTOLIN HFA) 108 (90 Base) MCG/ACT inhaler  budesonide (PULMICORT) 0.5 MG/2ML neb solution  clobetasol (TEMOVATE) 0.05 % ointment  desloratadine (CLARINEX) 5 MG tablet  desonide (DESOWEN) 0.05 % external cream  dexlansoprazole (DEXILANT) 30 MG CPDR CR capsule  EPINEPHrine (ANY BX GENERIC EQUIV) 0.3 MG/0.3ML injection 2-pack  EPINEPHrine (ANY BX GENERIC EQUIV) 0.3 MG/0.3ML injection 2-pack  esomeprazole (NEXIUM) 40 MG DR capsule  fluticasone (FLONASE) 50 MCG/ACT nasal spray  fluticasone-salmeterol (ADVAIR) 250-50 MCG/ACT inhaler  levothyroxine (SYNTHROID/LEVOTHROID) 125 MCG tablet  LEVOTHYROXINE SODIUM PO  mometasone (NASONEX) 50 MCG/ACT nasal spray  mometasone (NASONEX) 50 MCG/ACT nasal spray  montelukast (SINGULAIR) 10 MG tablet  ORDER FOR ALLERGEN IMMUNOTHERAPY  predniSONE (DELTASONE) 20 MG tablet  sulfamethoxazole-trimethoprim (BACTRIM DS) 800-160 MG tablet          Review of Systems   HENT:  Negative for ear pain.          "Scratchy throat   Respiratory:  Negative for shortness of breath.    Skin:  Positive for rash.        Itchiness       Physical Exam   BP: (!) 173/99  Pulse: 95  Temp: 98.4  F (36.9  C)  Resp: 16  Height: 177.8 cm (5' 10\")  Weight: 92.4 kg (203 lb 13 oz)  SpO2: 97 %      Physical Exam  Constitutional:       Appearance: Normal appearance. He is not ill-appearing.   HENT:      Right Ear: Ear canal normal.      Left Ear: Ear canal normal.      Ears:      Comments: Bilateral middle ear effusion     Mouth/Throat:      Pharynx: Posterior oropharyngeal erythema present.      Comments: Posterior oropharyngeal erythema and cobblestoning  Pulmonary:      Effort: Pulmonary effort is normal. No respiratory distress.      Breath sounds: No stridor.   Skin:     General: Skin is warm and dry.      Findings: Rash present.      Comments: Diffuse vesicular rash throughout trunk, arms, neck, sparing of the palms and soles.   Neurological:      General: No focal deficit present.      Mental Status: He is alert.         ED Course                      Critical Care time:  none     None         No results found for this or any previous visit (from the past 24 hours).    Medications - No data to display    Assessments & Plan (with Medical Decision Making)     I have reviewed the nursing notes.    I have reviewed the findings, diagnosis, plan and need for follow up with the patient.           Medical Decision Making  The patient's presentation was of straightforward complexity (a clearly self-limited or minor problem).    The patient's evaluation involved:  history and exam without other MDM data elements    The patient's management necessitated moderate risk (prescription drug management including medications given in the ED).    55-year-old male presenting emergency room with a diffuse body rash.  Possible allergic in nature.  Denies any shortness of breath, difficulty breathing.  He has been seen for this before and he was started on " prednisone which did help with the symptoms, but his side effects from the prednisone were undesirable so he stopped taking it.  He feels like it gets worse somewhat after mowing the lawn and being outside, but does not know if it is a distinct aggravating factor.    On exam he does have a diffuse vesicular rash that is pruritic in nature with some slight excoriations present in certain areas.  No obvious signs of cellulitis or underlying infection.    We discussed options for management given his undesirable side effects of the prednisone.  New prescription for Kenalog cream.  We also discussed over-the-counter use of hydrocortisone cream.  Patient is amenable to this plan appropriate for discharge.  We discussed the importance following up with his primary provider to have a single provider managing this to rule out other potential causes and treatments.    New Prescriptions    TRIAMCINOLONE (KENALOG) 0.1 % EXTERNAL CREAM    Apply to areas of concern for itch relief.       Final diagnoses:   Dermatitis       7/30/2025   [facility]       Tim Luna PA-C  07/30/25 [id number]

## 2025-07-31 NOTE — ED TRIAGE NOTES
Was in a few weeks ago for a rash and was given some prednisone and it made him really hot so he stopped taking it. States that now his rash is coming back. States he did mow the lawn yesterday and does not know if that is what caused it. It is really itchy.

## 2025-08-05 ENCOUNTER — ALLIED HEALTH/NURSE VISIT (OUTPATIENT)
Dept: ALLERGY | Facility: OTHER | Age: 55
End: 2025-08-05
Attending: PHYSICIAN ASSISTANT
Payer: COMMERCIAL

## 2025-08-05 DIAGNOSIS — R21 RASH AND NONSPECIFIC SKIN ERUPTION: Primary | ICD-10-CM

## 2025-08-05 DIAGNOSIS — J30.89 PERENNIAL ALLERGIC RHINITIS: ICD-10-CM

## 2025-08-05 RX ORDER — DEXAMETHASONE 4 MG/1
TABLET ORAL
Qty: 15 TABLET | Refills: 0 | Status: SHIPPED | OUTPATIENT
Start: 2025-08-05

## 2025-08-19 ENCOUNTER — ALLIED HEALTH/NURSE VISIT (OUTPATIENT)
Dept: ALLERGY | Facility: OTHER | Age: 55
End: 2025-08-19
Attending: PHYSICIAN ASSISTANT
Payer: COMMERCIAL

## 2025-08-19 DIAGNOSIS — J30.89 PERENNIAL ALLERGIC RHINITIS: Primary | ICD-10-CM
